# Patient Record
Sex: MALE | Race: WHITE | NOT HISPANIC OR LATINO | Employment: OTHER | URBAN - METROPOLITAN AREA
[De-identification: names, ages, dates, MRNs, and addresses within clinical notes are randomized per-mention and may not be internally consistent; named-entity substitution may affect disease eponyms.]

---

## 2017-01-08 DIAGNOSIS — I10 BENIGN ESSENTIAL HYPERTENSION: ICD-10-CM

## 2017-01-09 RX ORDER — IRBESARTAN 300 MG/1
TABLET ORAL
Qty: 90 TABLET | Refills: 0 | Status: SHIPPED | OUTPATIENT
Start: 2017-01-09 | End: 2017-02-20 | Stop reason: SDUPTHER

## 2017-01-13 ENCOUNTER — RESULTS ENCOUNTER (OUTPATIENT)
Dept: FAMILY MEDICINE CLINIC | Facility: CLINIC | Age: 82
End: 2017-01-13

## 2017-01-13 DIAGNOSIS — E53.8 B12 DEFICIENCY: ICD-10-CM

## 2017-01-13 DIAGNOSIS — R41.3 MEMORY LOSS: ICD-10-CM

## 2017-01-13 DIAGNOSIS — I10 BENIGN ESSENTIAL HYPERTENSION: ICD-10-CM

## 2017-01-17 DIAGNOSIS — K44.9 HIATAL HERNIA: ICD-10-CM

## 2017-01-17 DIAGNOSIS — K21.9 GASTROESOPHAGEAL REFLUX DISEASE, ESOPHAGITIS PRESENCE NOT SPECIFIED: ICD-10-CM

## 2017-01-17 RX ORDER — OMEPRAZOLE 40 MG/1
40 CAPSULE, DELAYED RELEASE ORAL NIGHTLY
Qty: 30 CAPSULE | Refills: 1 | Status: SHIPPED | OUTPATIENT
Start: 2017-01-17 | End: 2017-02-20 | Stop reason: SDUPTHER

## 2017-02-20 ENCOUNTER — OFFICE VISIT (OUTPATIENT)
Dept: FAMILY MEDICINE CLINIC | Facility: CLINIC | Age: 82
End: 2017-02-20

## 2017-02-20 VITALS
WEIGHT: 156 LBS | HEART RATE: 64 BPM | DIASTOLIC BLOOD PRESSURE: 68 MMHG | HEIGHT: 64 IN | RESPIRATION RATE: 16 BRPM | BODY MASS INDEX: 26.63 KG/M2 | SYSTOLIC BLOOD PRESSURE: 116 MMHG | TEMPERATURE: 97.8 F

## 2017-02-20 DIAGNOSIS — I10 BENIGN ESSENTIAL HYPERTENSION: Primary | ICD-10-CM

## 2017-02-20 DIAGNOSIS — K44.9 HIATAL HERNIA: ICD-10-CM

## 2017-02-20 DIAGNOSIS — E53.8 B12 DEFICIENCY: ICD-10-CM

## 2017-02-20 DIAGNOSIS — R41.3 MEMORY LOSS: ICD-10-CM

## 2017-02-20 DIAGNOSIS — K21.9 GASTROESOPHAGEAL REFLUX DISEASE, ESOPHAGITIS PRESENCE NOT SPECIFIED: ICD-10-CM

## 2017-02-20 DIAGNOSIS — Z13.6 SCREENING FOR ISCHEMIC HEART DISEASE: ICD-10-CM

## 2017-02-20 DIAGNOSIS — N40.1 BENIGN NON-NODULAR PROSTATIC HYPERPLASIA WITH LOWER URINARY TRACT SYMPTOMS: ICD-10-CM

## 2017-02-20 PROCEDURE — 99214 OFFICE O/P EST MOD 30 MIN: CPT | Performed by: FAMILY MEDICINE

## 2017-02-20 RX ORDER — OMEPRAZOLE 40 MG/1
40 CAPSULE, DELAYED RELEASE ORAL NIGHTLY
Qty: 90 CAPSULE | Refills: 1 | Status: SHIPPED | OUTPATIENT
Start: 2017-02-20 | End: 2017-08-21 | Stop reason: SDUPTHER

## 2017-02-20 RX ORDER — DOXAZOSIN MESYLATE 4 MG/1
4 TABLET ORAL DAILY
Qty: 90 TABLET | Refills: 1 | Status: SHIPPED | OUTPATIENT
Start: 2017-02-20 | End: 2017-08-21 | Stop reason: SDUPTHER

## 2017-02-20 RX ORDER — IRBESARTAN 300 MG/1
300 TABLET ORAL DAILY
Qty: 90 TABLET | Refills: 1 | Status: SHIPPED | OUTPATIENT
Start: 2017-02-20 | End: 2017-08-21 | Stop reason: SDUPTHER

## 2017-02-20 RX ORDER — METOPROLOL SUCCINATE 50 MG/1
50 TABLET, EXTENDED RELEASE ORAL NIGHTLY
Qty: 90 TABLET | Refills: 1 | Status: SHIPPED | OUTPATIENT
Start: 2017-02-20 | End: 2017-08-21 | Stop reason: SDUPTHER

## 2017-02-20 RX ORDER — RIVASTIGMINE 4.6 MG/24H
1 PATCH, EXTENDED RELEASE TRANSDERMAL DAILY
Qty: 90 PATCH | Refills: 1 | Status: SHIPPED | OUTPATIENT
Start: 2017-02-20 | End: 2017-08-21 | Stop reason: SDUPTHER

## 2017-02-20 NOTE — PROGRESS NOTES
"Chief Complaint   Patient presents with   • Hypertension       Subjective   This patient presents the office to refill medicines.  Labs are due.  Blood pressure is controlled.  GERD symptoms are stable with omeprazole.  He continues to use Exelon patch for his memory loss.  There is some skin irritation with the patch.The patient has been having some irritability on a daily basis for the past few months.     Review of Systems   Constitutional: Negative for fatigue.   Cardiovascular: Negative for chest pain.       Objective   Visit Vitals   • /68   • Pulse 64   • Temp 97.8 °F (36.6 °C) (Oral)   • Resp 16   • Ht 64\" (162.6 cm)   • Wt 156 lb (70.8 kg)   • BMI 26.78 kg/m2     Body mass index is 26.78 kg/(m^2).  Physical Exam   Constitutional: He is cooperative. No distress.   Eyes: Conjunctivae and lids are normal.   Neck: Carotid bruit is not present. No tracheal deviation present.   Cardiovascular: Normal rate, regular rhythm and normal heart sounds.    No murmur heard.  Pulmonary/Chest: Effort normal and breath sounds normal.   Neurological: He is alert. He is not disoriented.   Skin: Skin is warm and dry.   Psychiatric: He has a normal mood and affect. His speech is normal and behavior is normal.   Vitals reviewed.      Assessment/Plan     Problem List Items Addressed This Visit        Cardiovascular and Mediastinum    Benign essential hypertension - Primary    Relevant Medications    irbesartan (AVAPRO) 300 MG tablet    metoprolol succinate XL (TOPROL-XL) 50 MG 24 hr tablet    doxazosin (CARDURA) 4 MG tablet    Other Relevant Orders    Comprehensive metabolic panel    CBC and Differential    TSH       Respiratory    Hiatal hernia    Relevant Medications    omeprazole (PRILOSEC) 40 MG capsule       Digestive    GERD (gastroesophageal reflux disease)    Relevant Medications    omeprazole (PRILOSEC) 40 MG capsule    Other Relevant Orders    Comprehensive metabolic panel    CBC and Differential    B12 deficiency "    Relevant Orders    Vitamin B12 & Folate       Genitourinary    Benign hypertrophy of prostate       Other    Memory loss    Relevant Medications    rivastigmine (EXELON) 4.6 MG/24HR patch      Other Visit Diagnoses     Screening for ischemic heart disease        Relevant Orders    Lipid Panel With LDL/HDL Ratio          Outpatient Encounter Prescriptions as of 2/20/2017   Medication Sig Dispense Refill   • Cyanocobalamin (VITAMIN B-12) 2500 MCG sublingual tablet Place 1 tablet under the tongue daily.     • irbesartan (AVAPRO) 300 MG tablet Take 1 tablet by mouth Daily for 180 days. 90 tablet 1   • omeprazole (PRILOSEC) 40 MG capsule Take 1 capsule by mouth Every Night for 180 days. 90 capsule 1   • Polyethylene Glycol 3350 (MIRALAX PO) Take by mouth.     • [DISCONTINUED] irbesartan (AVAPRO) 300 MG tablet TAKE 1 TABLET EVERY NIGHT 90 tablet 0   • [DISCONTINUED] omeprazole (PRILOSEC) 40 MG capsule Take 1 capsule by mouth Every Night. 30 capsule 1   • doxazosin (CARDURA) 4 MG tablet Take 1 tablet by mouth Daily for 180 days. 90 tablet 1   • metoprolol succinate XL (TOPROL-XL) 50 MG 24 hr tablet Take 1 tablet by mouth Every Night for 180 days. 90 tablet 1   • rivastigmine (EXELON) 4.6 MG/24HR patch Place 1 patch on the skin Daily for 180 days. 90 patch 1   • [DISCONTINUED] doxazosin (CARDURA) 4 MG tablet Take 1 tablet by mouth Daily for 90 days. 90 tablet 0   • [DISCONTINUED] hyoscyamine (ANASPAZ,LEVSIN) 0.125 MG tablet Take 1 tablet by mouth 3 (three) times a day as needed for cramping for up to 180 days. 90 tablet 1   • [DISCONTINUED] metoprolol succinate XL (TOPROL-XL) 50 MG 24 hr tablet Take 1 tablet by mouth every night for 180 days. 90 tablet 1   • [DISCONTINUED] rivastigmine (EXELON) 4.6 MG/24HR patch Place 1 patch on the skin daily for 180 days. 90 patch 1     No facility-administered encounter medications on file as of 2/20/2017.        Orders Placed This Encounter   Procedures   • Comprehensive metabolic  panel     Standing Status:   Future     Standing Expiration Date:   11/17/2017   • Lipid Panel With LDL/HDL Ratio     Standing Status:   Future     Standing Expiration Date:   11/17/2017   • TSH     Standing Status:   Future     Standing Expiration Date:   11/17/2017   • Vitamin B12 & Folate     Standing Status:   Future     Standing Expiration Date:   11/17/2017       Continue with current treatment plan.         F/U in 6 months

## 2017-02-22 LAB
ALBUMIN SERPL-MCNC: 4.4 G/DL (ref 3.5–5.2)
ALBUMIN/GLOB SERPL: 1.8 G/DL
ALP SERPL-CCNC: 58 U/L (ref 39–117)
ALT SERPL-CCNC: 12 U/L (ref 1–41)
AST SERPL-CCNC: 21 U/L (ref 1–40)
BASOPHILS # BLD AUTO: 0.02 10*3/MM3 (ref 0–0.2)
BASOPHILS NFR BLD AUTO: 0.3 % (ref 0–1.5)
BILIRUB SERPL-MCNC: 0.9 MG/DL (ref 0.1–1.2)
BUN SERPL-MCNC: 12 MG/DL (ref 8–23)
BUN/CREAT SERPL: 15 (ref 7–25)
CALCIUM SERPL-MCNC: 9.6 MG/DL (ref 8.6–10.5)
CHLORIDE SERPL-SCNC: 100 MMOL/L (ref 98–107)
CHOLEST SERPL-MCNC: 157 MG/DL (ref 0–200)
CO2 SERPL-SCNC: 26.6 MMOL/L (ref 22–29)
CREAT SERPL-MCNC: 0.8 MG/DL (ref 0.76–1.27)
EOSINOPHIL # BLD AUTO: 0.12 10*3/MM3 (ref 0–0.7)
EOSINOPHIL NFR BLD AUTO: 2 % (ref 0.3–6.2)
ERYTHROCYTE [DISTWIDTH] IN BLOOD BY AUTOMATED COUNT: 13.1 % (ref 11.5–14.5)
FOLATE SERPL-MCNC: 14.84 NG/ML (ref 4.78–24.2)
GLOBULIN SER CALC-MCNC: 2.5 GM/DL
GLUCOSE SERPL-MCNC: 98 MG/DL (ref 65–99)
HCT VFR BLD AUTO: 42.4 % (ref 40.4–52.2)
HDLC SERPL-MCNC: 48 MG/DL (ref 40–60)
HGB BLD-MCNC: 13.7 G/DL (ref 13.7–17.6)
IMM GRANULOCYTES # BLD: 0 10*3/MM3 (ref 0–0.03)
IMM GRANULOCYTES NFR BLD: 0 % (ref 0–0.5)
LDLC SERPL CALC-MCNC: 86 MG/DL (ref 0–100)
LDLC/HDLC SERPL: 1.79 {RATIO}
LYMPHOCYTES # BLD AUTO: 1.52 10*3/MM3 (ref 0.9–4.8)
LYMPHOCYTES NFR BLD AUTO: 25 % (ref 19.6–45.3)
MCH RBC QN AUTO: 30 PG (ref 27–32.7)
MCHC RBC AUTO-ENTMCNC: 32.3 G/DL (ref 32.6–36.4)
MCV RBC AUTO: 92.8 FL (ref 79.8–96.2)
MONOCYTES # BLD AUTO: 0.46 10*3/MM3 (ref 0.2–1.2)
MONOCYTES NFR BLD AUTO: 7.6 % (ref 5–12)
NEUTROPHILS # BLD AUTO: 3.95 10*3/MM3 (ref 1.9–8.1)
NEUTROPHILS NFR BLD AUTO: 65.1 % (ref 42.7–76)
PLATELET # BLD AUTO: 195 10*3/MM3 (ref 140–500)
POTASSIUM SERPL-SCNC: 4.5 MMOL/L (ref 3.5–5.2)
PROT SERPL-MCNC: 6.9 G/DL (ref 6–8.5)
RBC # BLD AUTO: 4.57 10*6/MM3 (ref 4.6–6)
SODIUM SERPL-SCNC: 141 MMOL/L (ref 136–145)
TRIGL SERPL-MCNC: 116 MG/DL (ref 0–150)
TSH SERPL DL<=0.005 MIU/L-ACNC: 1.88 MIU/ML (ref 0.27–4.2)
VIT B12 SERPL-MCNC: 811 PG/ML (ref 211–946)
VLDLC SERPL CALC-MCNC: 23.2 MG/DL (ref 5–40)
WBC # BLD AUTO: 6.07 10*3/MM3 (ref 4.5–10.7)

## 2017-02-24 NOTE — PROGRESS NOTES
Please accept these satisfactory lab results. Please keep regular follow up appointment as scheduled.                                                    Dr. Hardwick

## 2017-07-15 DIAGNOSIS — I10 BENIGN ESSENTIAL HYPERTENSION: ICD-10-CM

## 2017-07-17 RX ORDER — DOXAZOSIN MESYLATE 4 MG/1
TABLET ORAL
Qty: 90 TABLET | Refills: 0 | OUTPATIENT
Start: 2017-07-17

## 2017-07-20 ENCOUNTER — RESULTS ENCOUNTER (OUTPATIENT)
Dept: FAMILY MEDICINE CLINIC | Facility: CLINIC | Age: 82
End: 2017-07-20

## 2017-07-20 DIAGNOSIS — K21.9 GASTROESOPHAGEAL REFLUX DISEASE, ESOPHAGITIS PRESENCE NOT SPECIFIED: ICD-10-CM

## 2017-07-20 DIAGNOSIS — Z13.6 SCREENING FOR ISCHEMIC HEART DISEASE: ICD-10-CM

## 2017-07-20 DIAGNOSIS — E53.8 B12 DEFICIENCY: ICD-10-CM

## 2017-07-20 DIAGNOSIS — I10 BENIGN ESSENTIAL HYPERTENSION: ICD-10-CM

## 2017-08-14 DIAGNOSIS — K44.9 HIATAL HERNIA: ICD-10-CM

## 2017-08-14 DIAGNOSIS — K21.9 GASTROESOPHAGEAL REFLUX DISEASE, ESOPHAGITIS PRESENCE NOT SPECIFIED: ICD-10-CM

## 2017-08-14 RX ORDER — OMEPRAZOLE 40 MG/1
CAPSULE, DELAYED RELEASE ORAL
Qty: 90 CAPSULE | Refills: 0 | OUTPATIENT
Start: 2017-08-14

## 2017-08-15 LAB
ALBUMIN SERPL-MCNC: 4.3 G/DL (ref 3.5–5.2)
ALBUMIN/GLOB SERPL: 2.2 G/DL
ALP SERPL-CCNC: 52 U/L (ref 39–117)
ALT SERPL-CCNC: 13 U/L (ref 1–41)
AST SERPL-CCNC: 18 U/L (ref 1–40)
BASOPHILS # BLD AUTO: 0.02 10*3/MM3 (ref 0–0.2)
BASOPHILS NFR BLD AUTO: 0.3 % (ref 0–1.5)
BILIRUB SERPL-MCNC: 0.5 MG/DL (ref 0.1–1.2)
BUN SERPL-MCNC: 14 MG/DL (ref 8–23)
BUN/CREAT SERPL: 18.7 (ref 7–25)
CALCIUM SERPL-MCNC: 9 MG/DL (ref 8.6–10.5)
CHLORIDE SERPL-SCNC: 103 MMOL/L (ref 98–107)
CHOLEST SERPL-MCNC: 145 MG/DL (ref 0–200)
CO2 SERPL-SCNC: 27 MMOL/L (ref 22–29)
CREAT SERPL-MCNC: 0.75 MG/DL (ref 0.76–1.27)
EOSINOPHIL # BLD AUTO: 0.18 10*3/MM3 (ref 0–0.7)
EOSINOPHIL NFR BLD AUTO: 3.1 % (ref 0.3–6.2)
ERYTHROCYTE [DISTWIDTH] IN BLOOD BY AUTOMATED COUNT: 12.9 % (ref 11.5–14.5)
FOLATE SERPL-MCNC: >20 NG/ML (ref 4.78–24.2)
GLOBULIN SER CALC-MCNC: 2 GM/DL
GLUCOSE SERPL-MCNC: 104 MG/DL (ref 65–99)
HCT VFR BLD AUTO: 40.3 % (ref 40.4–52.2)
HDLC SERPL-MCNC: 46 MG/DL (ref 40–60)
HGB BLD-MCNC: 13.1 G/DL (ref 13.7–17.6)
IMM GRANULOCYTES # BLD: 0 10*3/MM3 (ref 0–0.03)
IMM GRANULOCYTES NFR BLD: 0 % (ref 0–0.5)
LDLC SERPL CALC-MCNC: 82 MG/DL (ref 0–100)
LDLC/HDLC SERPL: 1.78 {RATIO}
LYMPHOCYTES # BLD AUTO: 1.23 10*3/MM3 (ref 0.9–4.8)
LYMPHOCYTES NFR BLD AUTO: 21.4 % (ref 19.6–45.3)
MCH RBC QN AUTO: 31.1 PG (ref 27–32.7)
MCHC RBC AUTO-ENTMCNC: 32.5 G/DL (ref 32.6–36.4)
MCV RBC AUTO: 95.7 FL (ref 79.8–96.2)
MONOCYTES # BLD AUTO: 0.42 10*3/MM3 (ref 0.2–1.2)
MONOCYTES NFR BLD AUTO: 7.3 % (ref 5–12)
NEUTROPHILS # BLD AUTO: 3.89 10*3/MM3 (ref 1.9–8.1)
NEUTROPHILS NFR BLD AUTO: 67.9 % (ref 42.7–76)
PLATELET # BLD AUTO: 161 10*3/MM3 (ref 140–500)
POTASSIUM SERPL-SCNC: 4.4 MMOL/L (ref 3.5–5.2)
PROT SERPL-MCNC: 6.3 G/DL (ref 6–8.5)
RBC # BLD AUTO: 4.21 10*6/MM3 (ref 4.6–6)
SODIUM SERPL-SCNC: 141 MMOL/L (ref 136–145)
TRIGL SERPL-MCNC: 86 MG/DL (ref 0–150)
TSH SERPL DL<=0.005 MIU/L-ACNC: 2.21 MIU/ML (ref 0.27–4.2)
VIT B12 SERPL-MCNC: 987 PG/ML (ref 211–946)
VLDLC SERPL CALC-MCNC: 17.2 MG/DL (ref 5–40)
WBC # BLD AUTO: 5.74 10*3/MM3 (ref 4.5–10.7)

## 2017-08-20 DIAGNOSIS — I10 BENIGN ESSENTIAL HYPERTENSION: ICD-10-CM

## 2017-08-20 DIAGNOSIS — R41.3 MEMORY LOSS: ICD-10-CM

## 2017-08-21 ENCOUNTER — OFFICE VISIT (OUTPATIENT)
Dept: FAMILY MEDICINE CLINIC | Facility: CLINIC | Age: 82
End: 2017-08-21

## 2017-08-21 VITALS
BODY MASS INDEX: 26.46 KG/M2 | HEART RATE: 68 BPM | SYSTOLIC BLOOD PRESSURE: 142 MMHG | WEIGHT: 155 LBS | RESPIRATION RATE: 16 BRPM | TEMPERATURE: 97.3 F | DIASTOLIC BLOOD PRESSURE: 82 MMHG | HEIGHT: 64 IN

## 2017-08-21 DIAGNOSIS — Z23 IMMUNIZATION DUE: ICD-10-CM

## 2017-08-21 DIAGNOSIS — K44.9 HIATAL HERNIA: ICD-10-CM

## 2017-08-21 DIAGNOSIS — K21.9 GASTROESOPHAGEAL REFLUX DISEASE, ESOPHAGITIS PRESENCE NOT SPECIFIED: ICD-10-CM

## 2017-08-21 DIAGNOSIS — E53.8 B12 DEFICIENCY: ICD-10-CM

## 2017-08-21 DIAGNOSIS — R41.3 MEMORY LOSS: ICD-10-CM

## 2017-08-21 DIAGNOSIS — I10 BENIGN ESSENTIAL HYPERTENSION: Primary | ICD-10-CM

## 2017-08-21 PROCEDURE — 90471 IMMUNIZATION ADMIN: CPT | Performed by: FAMILY MEDICINE

## 2017-08-21 PROCEDURE — 90732 PPSV23 VACC 2 YRS+ SUBQ/IM: CPT | Performed by: FAMILY MEDICINE

## 2017-08-21 PROCEDURE — 99214 OFFICE O/P EST MOD 30 MIN: CPT | Performed by: FAMILY MEDICINE

## 2017-08-21 RX ORDER — METOPROLOL SUCCINATE 50 MG/1
TABLET, EXTENDED RELEASE ORAL
Qty: 90 TABLET | Refills: 0 | OUTPATIENT
Start: 2017-08-21

## 2017-08-21 RX ORDER — IRBESARTAN 300 MG/1
300 TABLET ORAL DAILY
Qty: 90 TABLET | Refills: 1 | Status: SHIPPED | OUTPATIENT
Start: 2017-08-21 | End: 2018-01-29 | Stop reason: SDUPTHER

## 2017-08-21 RX ORDER — RIVASTIGMINE 4.6 MG/24H
1 PATCH, EXTENDED RELEASE TRANSDERMAL DAILY
Qty: 90 PATCH | Refills: 0 | Status: SHIPPED | OUTPATIENT
Start: 2017-08-21 | End: 2017-12-11 | Stop reason: SDUPTHER

## 2017-08-21 RX ORDER — DOXAZOSIN MESYLATE 4 MG/1
4 TABLET ORAL DAILY
Qty: 90 TABLET | Refills: 1 | Status: SHIPPED | OUTPATIENT
Start: 2017-08-21 | End: 2018-01-29 | Stop reason: SDUPTHER

## 2017-08-21 RX ORDER — METOPROLOL SUCCINATE 50 MG/1
50 TABLET, EXTENDED RELEASE ORAL NIGHTLY
Qty: 90 TABLET | Refills: 1 | Status: SHIPPED | OUTPATIENT
Start: 2017-08-21 | End: 2018-01-29 | Stop reason: SDUPTHER

## 2017-08-21 RX ORDER — RIVASTIGMINE 4.6 MG/24H
PATCH, EXTENDED RELEASE TRANSDERMAL
Refills: 0 | OUTPATIENT
Start: 2017-08-21

## 2017-08-21 RX ORDER — OMEPRAZOLE 40 MG/1
40 CAPSULE, DELAYED RELEASE ORAL NIGHTLY
Qty: 90 CAPSULE | Refills: 1 | Status: SHIPPED | OUTPATIENT
Start: 2017-08-21 | End: 2018-01-29 | Stop reason: SDUPTHER

## 2017-08-21 NOTE — PROGRESS NOTES
"Chief Complaint   Patient presents with   • Hypertension   • Heartburn   • Memory Loss       Subjective   Pt is here for lab review and medication refill. Labs show mild elevation of blood sugar.  His labs also showed some mild anemia.  We will continue to watch.  Vitamin B12 level was supratherapeutic.  He will decrease his current dose to 3 times weekly.  Lipids are stable.  Thyroid is normal.  He has had some difficulty with swallowing.  He has to use hot coffee to get some food down.  He continues to take proton pump inhibitor.  No interval weight loss noted.  He also has problem of increased gas and will start over-the-counter product with simethicone.  He continues to use Exelon patch for helping with memory loss.  Patient states that recent PSA was nondetectable.  I have reviewed and updated his medications, medical history and problem list during today's office visit.        Social History   Substance Use Topics   • Smoking status: Never Smoker   • Smokeless tobacco: Never Used   • Alcohol use No       Review of Systems   Constitutional: Negative for fatigue.   Cardiovascular: Negative for chest pain.       Objective   /82 (BP Location: Right arm, Patient Position: Sitting, Cuff Size: Adult)  Pulse 68  Temp 97.3 °F (36.3 °C) (Oral)   Resp 16  Ht 64\" (162.6 cm)  Wt 155 lb (70.3 kg)  BMI 26.61 kg/m2  Body mass index is 26.61 kg/(m^2).  Physical Exam   Constitutional: He is cooperative. No distress.   Eyes: Conjunctivae and lids are normal.   Neck: Carotid bruit is not present. No tracheal deviation present.   Cardiovascular: Normal rate, regular rhythm and normal heart sounds.    No murmur heard.  Pulmonary/Chest: Effort normal and breath sounds normal.   Neurological: He is alert. He is not disoriented.   Skin: Skin is warm and dry.   Psychiatric: He has a normal mood and affect. His speech is normal and behavior is normal.   Vitals reviewed.      Data Reviewed:    CMP:  Lab Results   Component " Value Date     (H) 08/15/2017    BUN 14 08/15/2017    CREATININE 0.75 (L) 08/15/2017    EGFRIFNONA 99 08/15/2017    EGFRIFAFRI 120 08/15/2017     08/15/2017    K 4.4 08/15/2017     08/15/2017    CALCIUM 9.0 08/15/2017    PROTENTOTREF 6.3 08/15/2017    ALBUMIN 4.30 08/15/2017    LABGLOBREF 2.0 08/15/2017    BILITOT 0.5 08/15/2017    ALKPHOS 52 08/15/2017    AST 18 08/15/2017    ALT 13 08/15/2017     CBC w/ diff:   Lab Results   Component Value Date    WBC 5.74 08/15/2017    RBC 4.21 (L) 08/15/2017    HGB 13.1 (L) 08/15/2017    HCT 40.3 (L) 08/15/2017    MCV 95.7 08/15/2017    MCH 31.1 08/15/2017    MCHC 32.5 (L) 08/15/2017    RDW 12.9 08/15/2017     08/15/2017    NEUTRORELPCT 67.9 08/15/2017    LYMPHORELPCT 21.4 08/15/2017    MONORELPCT 7.3 08/15/2017    EOSRELPCT 3.1 08/15/2017    BASORELPCT 0.3 08/15/2017     LIPID PANEL:  Lab Results   Component Value Date    CHLPL 145 08/15/2017    TRIG 86 08/15/2017    HDL 46 08/15/2017    VLDL 17.2 08/15/2017    LDL 82 08/15/2017    LDLHDL 1.78 08/15/2017     TSH:  Lab Results   Component Value Date    TSH 2.210 08/15/2017       Assessment/Plan     Problem List Items Addressed This Visit        Cardiovascular and Mediastinum    Benign essential hypertension - Primary    Relevant Medications    irbesartan (AVAPRO) 300 MG tablet    doxazosin (CARDURA) 4 MG tablet    metoprolol succinate XL (TOPROL-XL) 50 MG 24 hr tablet    Other Relevant Orders    Comprehensive metabolic panel    CBC and Differential    TSH       Respiratory    Hiatal hernia    Relevant Medications    omeprazole (PRILOSEC) 40 MG capsule       Digestive    GERD (gastroesophageal reflux disease)    Relevant Medications    omeprazole (PRILOSEC) 40 MG capsule    B12 deficiency    Relevant Medications    cyanocobalamin (RA VITAMIN B-12) 100 MCG tablet       Other    Memory loss    Relevant Medications    rivastigmine (EXELON) 4.6 MG/24HR patch      Other Visit Diagnoses     Immunization due         Relevant Orders    Pneumococcal Polysaccharide Vaccine 23-Valent Greater Than or Equal To 3yo Subcutaneous / IM          Outpatient Encounter Prescriptions as of 8/21/2017   Medication Sig Dispense Refill   • Polyethylene Glycol 3350 (MIRALAX PO) Take by mouth.     • [DISCONTINUED] Cyanocobalamin (VITAMIN B-12) 2500 MCG sublingual tablet Place 1 tablet under the tongue daily.     • cyanocobalamin (RA VITAMIN B-12) 100 MCG tablet Take 1 tablet by mouth Daily for 180 days. 30 tablet 5   • doxazosin (CARDURA) 4 MG tablet Take 1 tablet by mouth Daily for 180 days. 90 tablet 1   • irbesartan (AVAPRO) 300 MG tablet Take 1 tablet by mouth Daily for 180 days. 90 tablet 1   • metoprolol succinate XL (TOPROL-XL) 50 MG 24 hr tablet Take 1 tablet by mouth Every Night for 180 days. 90 tablet 1   • omeprazole (PRILOSEC) 40 MG capsule Take 1 capsule by mouth Every Night for 180 days. 90 capsule 1   • rivastigmine (EXELON) 4.6 MG/24HR patch Place 1 patch on the skin Daily for 90 days. 90 patch 0   • [DISCONTINUED] doxazosin (CARDURA) 4 MG tablet Take 1 tablet by mouth Daily for 180 days. 90 tablet 1   • [DISCONTINUED] irbesartan (AVAPRO) 300 MG tablet Take 1 tablet by mouth Daily for 180 days. 90 tablet 1   • [DISCONTINUED] metoprolol succinate XL (TOPROL-XL) 50 MG 24 hr tablet Take 1 tablet by mouth Every Night for 180 days. 90 tablet 1   • [DISCONTINUED] omeprazole (PRILOSEC) 40 MG capsule Take 1 capsule by mouth Every Night for 180 days. 90 capsule 1   • [DISCONTINUED] rivastigmine (EXELON) 4.6 MG/24HR patch Place 1 patch on the skin Daily for 180 days. 90 patch 1     No facility-administered encounter medications on file as of 8/21/2017.        Orders Placed This Encounter   Procedures   • Pneumococcal Polysaccharide Vaccine 23-Valent Greater Than or Equal To 3yo Subcutaneous / IM   • Comprehensive metabolic panel     Standing Status:   Future     Standing Expiration Date:   5/18/2018   • TSH     Standing Status:   Future      Standing Expiration Date:   5/18/2018   • CBC and Differential     Standing Status:   Future     Standing Expiration Date:   5/18/2018     Order Specific Question:   Manual Differential     Answer:   No       Continue with current treatment plan.         F/U in 6 months

## 2017-08-21 NOTE — PATIENT INSTRUCTIONS
Check on insurance coverage and cost for adacel Tdap(tetanus plus whooping cough vaccine) and get the immunization at your local pharmacy    Influenza vaccine in October

## 2017-10-11 ENCOUNTER — OFFICE VISIT (OUTPATIENT)
Dept: FAMILY MEDICINE CLINIC | Facility: CLINIC | Age: 82
End: 2017-10-11

## 2017-10-11 VITALS
TEMPERATURE: 97.7 F | WEIGHT: 150 LBS | HEART RATE: 86 BPM | DIASTOLIC BLOOD PRESSURE: 88 MMHG | RESPIRATION RATE: 16 BRPM | BODY MASS INDEX: 25.61 KG/M2 | HEIGHT: 64 IN | SYSTOLIC BLOOD PRESSURE: 161 MMHG

## 2017-10-11 DIAGNOSIS — R61 UNEXPLAINED NIGHT SWEATS: Primary | ICD-10-CM

## 2017-10-11 DIAGNOSIS — R13.19 OTHER DYSPHAGIA: ICD-10-CM

## 2017-10-11 DIAGNOSIS — R63.4 WEIGHT LOSS: ICD-10-CM

## 2017-10-11 DIAGNOSIS — R23.2 HOT FLASHES: ICD-10-CM

## 2017-10-11 PROCEDURE — 99214 OFFICE O/P EST MOD 30 MIN: CPT | Performed by: FAMILY MEDICINE

## 2017-10-11 PROCEDURE — 71020 XR CHEST PA AND LATERAL: CPT | Performed by: FAMILY MEDICINE

## 2017-10-11 NOTE — PROGRESS NOTES
"Chief Complaint   Patient presents with   • Night Sweats       Subjective   This patient presents the office with a chief complaint of severe night sweats.  His symptoms started about 4 weeks ago.  He is awakened at night 1-2 times each evening with drenching of his shirt and bedclothes.  There has been mild interval weight loss.  The patient also has issues of difficulty swallowing.  He does take omeprazole.  His last upper endoscopy was about 3 years ago.  I have reviewed and updated his medications, medical history and problem list during today's office visit.        Social History   Substance Use Topics   • Smoking status: Never Smoker   • Smokeless tobacco: Never Used   • Alcohol use No       Review of Systems   Constitutional: Positive for diaphoresis and unexpected weight change. Negative for fever.   HENT: Negative for sore throat.    Gastrointestinal:        Trouble swallowing       Objective   /88  Pulse 86  Temp 97.7 °F (36.5 °C) (Oral)   Resp 16  Ht 64\" (162.6 cm)  Wt 150 lb (68 kg)  BMI 25.75 kg/m2  Body mass index is 25.75 kg/(m^2).  Physical Exam   Constitutional: He is cooperative. No distress.   Eyes: Conjunctivae and lids are normal.   Neck: Carotid bruit is not present. No tracheal deviation present.   Cardiovascular: Normal rate, regular rhythm and normal heart sounds.    No murmur heard.  Pulmonary/Chest: Effort normal and breath sounds normal.   Neurological: He is alert. He is not disoriented.   Skin: Skin is warm and dry.   Psychiatric: He has a normal mood and affect. His speech is normal and behavior is normal.   Vitals reviewed.      Data Reviewed:    Views: lateral and posterior-anterior    Relevant Clinical Issues/Diagnoses/Indications for XRay: see HPI  Clinical Findings: Chest: was negative for infiltrate, effusion, pneumothorax, or wide mediastinum    Compared with previous XRay? No comparison or previous xray was looked at today    Date of Previous Xray:unknown " date    Changes on current Xray? No comparison or previous xray was looked at today      Assessment/Plan     Problem List Items Addressed This Visit        Digestive    Other dysphagia    Relevant Orders    Ambulatory Referral to Gastroenterology      Other Visit Diagnoses     Unexplained night sweats    -  Primary    Relevant Orders    XR Chest PA & Lateral    Comprehensive metabolic panel    CBC and Differential    TSH    Ambulatory Referral to Gastroenterology    Hot flashes        Relevant Orders    XR Chest PA & Lateral    Comprehensive metabolic panel    CBC and Differential    TSH    Ambulatory Referral to Gastroenterology    Weight loss        Relevant Orders    XR Chest PA & Lateral    Comprehensive metabolic panel    CBC and Differential    TSH    Ambulatory Referral to Gastroenterology          Outpatient Encounter Prescriptions as of 10/11/2017   Medication Sig Dispense Refill   • cyanocobalamin (RA VITAMIN B-12) 100 MCG tablet Take 1 tablet by mouth Daily for 180 days. 30 tablet 5   • doxazosin (CARDURA) 4 MG tablet Take 1 tablet by mouth Daily for 180 days. 90 tablet 1   • irbesartan (AVAPRO) 300 MG tablet Take 1 tablet by mouth Daily for 180 days. 90 tablet 1   • metoprolol succinate XL (TOPROL-XL) 50 MG 24 hr tablet Take 1 tablet by mouth Every Night for 180 days. 90 tablet 1   • omeprazole (PRILOSEC) 40 MG capsule Take 1 capsule by mouth Every Night for 180 days. 90 capsule 1   • Polyethylene Glycol 3350 (MIRALAX PO) Take by mouth.     • rivastigmine (EXELON) 4.6 MG/24HR patch Place 1 patch on the skin Daily for 90 days. 90 patch 0     No facility-administered encounter medications on file as of 10/11/2017.        Orders Placed This Encounter   Procedures   • XR Chest PA & Lateral     Order Specific Question:   Reason for Exam:     Answer:   night sweats   • Comprehensive metabolic panel     Standing Status:   Future     Standing Expiration Date:   7/8/2018   • TSH     Standing Status:   Future      Standing Expiration Date:   7/8/2018   • Ambulatory Referral to Gastroenterology     Referral Priority:   Routine     Referral Type:   Consultation     Referral Reason:   Specialty Services Required     Referred to Provider:   Adalid Mazariegos MD     Requested Specialty:   Gastroenterology     Number of Visits Requested:   1   • CBC and Differential     Standing Status:   Future     Standing Expiration Date:   7/8/2018     Order Specific Question:   Manual Differential     Answer:   No       Continue with current treatment plan.         RTC as needed or sooner if symptoms worsen or change

## 2017-10-12 DIAGNOSIS — R63.4 WEIGHT LOSS: ICD-10-CM

## 2017-10-12 DIAGNOSIS — R23.2 HOT FLASHES: ICD-10-CM

## 2017-10-12 DIAGNOSIS — I10 BENIGN ESSENTIAL HYPERTENSION: ICD-10-CM

## 2017-10-12 DIAGNOSIS — R61 UNEXPLAINED NIGHT SWEATS: ICD-10-CM

## 2017-10-12 LAB
ALBUMIN SERPL-MCNC: 4.2 G/DL (ref 3.5–5.2)
ALBUMIN/GLOB SERPL: 1.9 G/DL
ALP SERPL-CCNC: 52 U/L (ref 39–117)
ALT SERPL-CCNC: 11 U/L (ref 1–41)
AST SERPL-CCNC: 18 U/L (ref 1–40)
BASOPHILS # BLD AUTO: 0.01 10*3/MM3 (ref 0–0.2)
BASOPHILS NFR BLD AUTO: 0.2 % (ref 0–1.5)
BILIRUB SERPL-MCNC: 0.7 MG/DL (ref 0.1–1.2)
BUN SERPL-MCNC: 11 MG/DL (ref 8–23)
BUN/CREAT SERPL: 12.1 (ref 7–25)
CALCIUM SERPL-MCNC: 9.1 MG/DL (ref 8.6–10.5)
CHLORIDE SERPL-SCNC: 101 MMOL/L (ref 98–107)
CO2 SERPL-SCNC: 26.1 MMOL/L (ref 22–29)
CREAT SERPL-MCNC: 0.91 MG/DL (ref 0.76–1.27)
EOSINOPHIL # BLD AUTO: 0.18 10*3/MM3 (ref 0–0.7)
EOSINOPHIL NFR BLD AUTO: 3.2 % (ref 0.3–6.2)
ERYTHROCYTE [DISTWIDTH] IN BLOOD BY AUTOMATED COUNT: 12.9 % (ref 11.5–14.5)
GLOBULIN SER CALC-MCNC: 2.2 GM/DL
GLUCOSE SERPL-MCNC: 99 MG/DL (ref 65–99)
HCT VFR BLD AUTO: 39.7 % (ref 40.4–52.2)
HGB BLD-MCNC: 13.1 G/DL (ref 13.7–17.6)
IMM GRANULOCYTES # BLD: 0 10*3/MM3 (ref 0–0.03)
IMM GRANULOCYTES NFR BLD: 0 % (ref 0–0.5)
LYMPHOCYTES # BLD AUTO: 1.56 10*3/MM3 (ref 0.9–4.8)
LYMPHOCYTES NFR BLD AUTO: 28 % (ref 19.6–45.3)
MCH RBC QN AUTO: 31 PG (ref 27–32.7)
MCHC RBC AUTO-ENTMCNC: 33 G/DL (ref 32.6–36.4)
MCV RBC AUTO: 94.1 FL (ref 79.8–96.2)
MONOCYTES # BLD AUTO: 0.34 10*3/MM3 (ref 0.2–1.2)
MONOCYTES NFR BLD AUTO: 6.1 % (ref 5–12)
NEUTROPHILS # BLD AUTO: 3.49 10*3/MM3 (ref 1.9–8.1)
NEUTROPHILS NFR BLD AUTO: 62.5 % (ref 42.7–76)
PLATELET # BLD AUTO: 164 10*3/MM3 (ref 140–500)
POTASSIUM SERPL-SCNC: 4.3 MMOL/L (ref 3.5–5.2)
PROT SERPL-MCNC: 6.4 G/DL (ref 6–8.5)
RBC # BLD AUTO: 4.22 10*6/MM3 (ref 4.6–6)
SODIUM SERPL-SCNC: 139 MMOL/L (ref 136–145)
TSH SERPL DL<=0.005 MIU/L-ACNC: 1.54 MIU/ML (ref 0.27–4.2)
WBC # BLD AUTO: 5.58 10*3/MM3 (ref 4.5–10.7)

## 2017-10-16 NOTE — PROGRESS NOTES
Call patient and let him know that labs do not suggest a reason for his night sweats. Hemoglobin is mildly decreased but stable when compared with labs 7 months ago. If GI workup is negative, then have him follow up with me again to look at this hemoglobin problem. Thanks, Dr. Hardwick

## 2017-10-26 ENCOUNTER — OFFICE (AMBULATORY)
Dept: URBAN - METROPOLITAN AREA CLINIC 75 | Facility: CLINIC | Age: 82
End: 2017-10-26

## 2017-10-26 VITALS
SYSTOLIC BLOOD PRESSURE: 142 MMHG | WEIGHT: 150 LBS | HEIGHT: 62 IN | HEART RATE: 86 BPM | DIASTOLIC BLOOD PRESSURE: 82 MMHG

## 2017-10-26 DIAGNOSIS — R63.4 ABNORMAL WEIGHT LOSS: ICD-10-CM

## 2017-10-26 DIAGNOSIS — R13.10 DYSPHAGIA, UNSPECIFIED: ICD-10-CM

## 2017-10-26 DIAGNOSIS — R93.3 ABNORMAL FINDINGS ON DIAGNOSTIC IMAGING OF OTHER PARTS OF DI: ICD-10-CM

## 2017-10-26 PROCEDURE — 99203 OFFICE O/P NEW LOW 30 MIN: CPT | Performed by: INTERNAL MEDICINE

## 2017-10-26 NOTE — SERVICEHPINOTES
I had the pleasure of seeing Mr. Yanez in our gastroenterology office for new patient consultation. As you know, he is a pleasant 86 year old  gentleman that presents with his wife and daughter for evaluation of recurrent difficulty swallowing solids and weight loss. Patient reports a long history of dysphagia with multiple EGDs and dilation with reported improvement of symptoms. His most recent EGD was done 4/1/2013 at [facility] in [place], [place]. He had an esophagram 8/25/2015 that showed a para-esophageal hernia. At present, he reports difficulty swallow with food impaction that occurs more so with meats and breads. This occurs 3-4 times per week and more often with pills. He will have to regurgitate food boluses he is unable to pass. He denies trouble with thin liquids. He takes omeprazole 40mg once daily and reports good control of his reflux. He denies odynophagia, nausea, vomiting, abdominal pain, bloating or distension. Family history si negative for esophageal or gastric cancer. He is a non-smoker.  Patient states that he has a 1 formed BM per day as long as he takes a daily does of Miralax. He denies change in bowel pattern, blood in his stool, melena or unexplained weight loss. He has had colonoscopies in the past without significant findings. Family history is negative for polyps, colitis or colon cancer. We do not have these for review at todays office visit. He denies family history of colon cancer or IBD. He reports weight loss of 6 pounds over the last 8 months however patient admits to watching his weight. Appetite is in tact and he eats a balance diet of fruits and vegetables, "wife is a good cook". BR

## 2017-11-06 VITALS
HEART RATE: 62 BPM | SYSTOLIC BLOOD PRESSURE: 124 MMHG | OXYGEN SATURATION: 99 % | HEART RATE: 53 BPM | OXYGEN SATURATION: 90 % | HEART RATE: 46 BPM | OXYGEN SATURATION: 92 % | DIASTOLIC BLOOD PRESSURE: 78 MMHG | RESPIRATION RATE: 34 BRPM | TEMPERATURE: 97.9 F | SYSTOLIC BLOOD PRESSURE: 188 MMHG | DIASTOLIC BLOOD PRESSURE: 70 MMHG | RESPIRATION RATE: 19 BRPM | DIASTOLIC BLOOD PRESSURE: 79 MMHG | SYSTOLIC BLOOD PRESSURE: 181 MMHG | DIASTOLIC BLOOD PRESSURE: 82 MMHG | SYSTOLIC BLOOD PRESSURE: 150 MMHG | HEART RATE: 75 BPM | HEART RATE: 64 BPM | SYSTOLIC BLOOD PRESSURE: 180 MMHG | TEMPERATURE: 97.7 F | SYSTOLIC BLOOD PRESSURE: 138 MMHG | WEIGHT: 147 LBS | SYSTOLIC BLOOD PRESSURE: 185 MMHG | DIASTOLIC BLOOD PRESSURE: 69 MMHG | HEART RATE: 63 BPM | SYSTOLIC BLOOD PRESSURE: 208 MMHG | OXYGEN SATURATION: 95 % | RESPIRATION RATE: 16 BRPM | HEIGHT: 62 IN | HEART RATE: 60 BPM | HEART RATE: 58 BPM | DIASTOLIC BLOOD PRESSURE: 65 MMHG | DIASTOLIC BLOOD PRESSURE: 84 MMHG

## 2017-11-07 ENCOUNTER — AMBULATORY SURGICAL CENTER (AMBULATORY)
Dept: URBAN - METROPOLITAN AREA SURGERY 17 | Facility: SURGERY | Age: 82
End: 2017-11-07
Payer: MEDICARE

## 2017-11-07 DIAGNOSIS — R13.10 DYSPHAGIA, UNSPECIFIED: ICD-10-CM

## 2017-11-07 DIAGNOSIS — K44.9 DIAPHRAGMATIC HERNIA WITHOUT OBSTRUCTION OR GANGRENE: ICD-10-CM

## 2017-11-07 DIAGNOSIS — R63.4 ABNORMAL WEIGHT LOSS: ICD-10-CM

## 2017-11-07 DIAGNOSIS — K57.10 DIVERTICULOSIS OF SMALL INTESTINE WITHOUT PERFORATION OR ABS: ICD-10-CM

## 2017-11-07 DIAGNOSIS — R93.3 ABNORMAL FINDINGS ON DIAGNOSTIC IMAGING OF OTHER PARTS OF DI: ICD-10-CM

## 2017-11-07 PROCEDURE — 43235 EGD DIAGNOSTIC BRUSH WASH: CPT | Performed by: INTERNAL MEDICINE

## 2017-11-07 RX ADMIN — PROPOFOL 25 MG: 10 INJECTION, EMULSION INTRAVENOUS at 14:40

## 2017-11-07 RX ADMIN — PROPOFOL 25 MG: 10 INJECTION, EMULSION INTRAVENOUS at 14:41

## 2017-11-07 RX ADMIN — PROPOFOL 25 MG: 10 INJECTION, EMULSION INTRAVENOUS at 14:42

## 2017-11-07 RX ADMIN — PROPOFOL 75 MG: 10 INJECTION, EMULSION INTRAVENOUS at 14:38

## 2017-11-07 RX ADMIN — LIDOCAINE HYDROCHLORIDE 30 MG: 10 INJECTION, SOLUTION EPIDURAL; INFILTRATION; INTRACAUDAL; PERINEURAL at 14:37

## 2017-11-07 NOTE — SERVICEHPINOTES
I had the pleasure of seeing Mr. Yanez in our gastroenterology office for new patient consultation. As you know, he is a pleasant 86 year old  gentleman that presents with his wife and daughter for evaluation of recurrent difficulty swallowing solids and weight loss. Patient reports a long history of dysphagia with multiple EGDs and dilation with reported improvement of symptoms. His most recent EGD was done 4/1/2013 at [facility] in [place], [place]. He had an esophagram 8/25/2015 that showed a para-esophageal hernia. At present, he reports difficulty swallow with food impaction that occurs more so with meats and breads. This occurs 3-4 times per week and more often with pills. He will have to regurgitate food boluses he is unable to pass. He denies trouble with thin liquids. He takes omeprazole 40mg once daily and reports good control of his reflux. He denies odynophagia, nausea, vomiting, abdominal pain, bloating or distension. Family history si negative for esophageal or gastric cancer. He is a non-smoker. Patient states that he has a 1 formed BM per day as long as he takes a daily does of Miralax. He denies change in bowel pattern, blood in his stool, melena or unexplained weight loss. He has had colonoscopies in the past without significant findings. Family history is negative for polyps, colitis or colon cancer. We do not have these for review at todays office visit. He denies family history of colon cancer or IBD. He reports weight loss of 6 pounds over the last 8 months however patient admits to watching his weight. Appetite is in tact and he eats a balance diet of fruits and vegetables, "wife is a good cook".

## 2017-12-11 DIAGNOSIS — R41.3 MEMORY LOSS: ICD-10-CM

## 2017-12-12 RX ORDER — RIVASTIGMINE 4.6 MG/24H
PATCH, EXTENDED RELEASE TRANSDERMAL
Qty: 90 PATCH | Refills: 0 | Status: SHIPPED | OUTPATIENT
Start: 2017-12-12 | End: 2018-01-29 | Stop reason: SDUPTHER

## 2018-01-01 ENCOUNTER — OFFICE (AMBULATORY)
Dept: URBAN - METROPOLITAN AREA CLINIC 75 | Facility: CLINIC | Age: 83
End: 2018-01-01

## 2018-01-01 VITALS
WEIGHT: 160 LBS | HEIGHT: 62 IN | HEART RATE: 70 BPM | DIASTOLIC BLOOD PRESSURE: 72 MMHG | SYSTOLIC BLOOD PRESSURE: 126 MMHG

## 2018-01-01 DIAGNOSIS — K57.10 DIVERTICULOSIS OF SMALL INTESTINE WITHOUT PERFORATION OR ABS: ICD-10-CM

## 2018-01-01 DIAGNOSIS — R13.10 DYSPHAGIA, UNSPECIFIED: ICD-10-CM

## 2018-01-01 DIAGNOSIS — R93.3 ABNORMAL FINDINGS ON DIAGNOSTIC IMAGING OF OTHER PARTS OF DI: ICD-10-CM

## 2018-01-01 DIAGNOSIS — K29.70 GASTRITIS, UNSPECIFIED, WITHOUT BLEEDING: ICD-10-CM

## 2018-01-01 DIAGNOSIS — K22.2 ESOPHAGEAL OBSTRUCTION: ICD-10-CM

## 2018-01-01 DIAGNOSIS — R63.4 ABNORMAL WEIGHT LOSS: ICD-10-CM

## 2018-01-01 DIAGNOSIS — K44.9 DIAPHRAGMATIC HERNIA WITHOUT OBSTRUCTION OR GANGRENE: ICD-10-CM

## 2018-01-01 PROCEDURE — 99214 OFFICE O/P EST MOD 30 MIN: CPT | Performed by: INTERNAL MEDICINE

## 2018-01-29 ENCOUNTER — OFFICE VISIT (OUTPATIENT)
Dept: FAMILY MEDICINE CLINIC | Facility: CLINIC | Age: 83
End: 2018-01-29

## 2018-01-29 VITALS
WEIGHT: 147 LBS | DIASTOLIC BLOOD PRESSURE: 68 MMHG | TEMPERATURE: 97.2 F | HEART RATE: 65 BPM | HEIGHT: 64 IN | SYSTOLIC BLOOD PRESSURE: 148 MMHG | RESPIRATION RATE: 16 BRPM | BODY MASS INDEX: 25.1 KG/M2

## 2018-01-29 DIAGNOSIS — I10 BENIGN ESSENTIAL HYPERTENSION: Primary | ICD-10-CM

## 2018-01-29 DIAGNOSIS — R45.1 AGITATION: ICD-10-CM

## 2018-01-29 DIAGNOSIS — K44.9 HIATAL HERNIA: ICD-10-CM

## 2018-01-29 DIAGNOSIS — R41.3 MEMORY LOSS: ICD-10-CM

## 2018-01-29 DIAGNOSIS — K21.9 GASTROESOPHAGEAL REFLUX DISEASE, ESOPHAGITIS PRESENCE NOT SPECIFIED: ICD-10-CM

## 2018-01-29 PROBLEM — R13.19 OTHER DYSPHAGIA: Status: RESOLVED | Noted: 2017-10-11 | Resolved: 2018-01-29

## 2018-01-29 PROCEDURE — 99214 OFFICE O/P EST MOD 30 MIN: CPT | Performed by: FAMILY MEDICINE

## 2018-01-29 RX ORDER — RIVASTIGMINE 4.6 MG/24H
1 PATCH, EXTENDED RELEASE TRANSDERMAL DAILY
Qty: 90 PATCH | Refills: 1 | Status: SHIPPED | OUTPATIENT
Start: 2018-01-29 | End: 2018-05-10 | Stop reason: SDUPTHER

## 2018-01-29 RX ORDER — BUSPIRONE HYDROCHLORIDE 5 MG/1
5 TABLET ORAL
Qty: 180 TABLET | Refills: 1 | Status: SHIPPED | OUTPATIENT
Start: 2018-01-29 | End: 2018-03-12 | Stop reason: SDUPTHER

## 2018-01-29 RX ORDER — DOXAZOSIN MESYLATE 4 MG/1
4 TABLET ORAL DAILY
Qty: 90 TABLET | Refills: 1 | Status: SHIPPED | OUTPATIENT
Start: 2018-01-29 | End: 2018-05-10 | Stop reason: SDUPTHER

## 2018-01-29 RX ORDER — OMEPRAZOLE 40 MG/1
40 CAPSULE, DELAYED RELEASE ORAL NIGHTLY
Qty: 90 CAPSULE | Refills: 1 | Status: SHIPPED | OUTPATIENT
Start: 2018-01-29 | End: 2018-05-10 | Stop reason: SDUPTHER

## 2018-01-29 RX ORDER — METOPROLOL SUCCINATE 50 MG/1
50 TABLET, EXTENDED RELEASE ORAL NIGHTLY
Qty: 90 TABLET | Refills: 1 | Status: SHIPPED | OUTPATIENT
Start: 2018-01-29 | End: 2018-05-10 | Stop reason: SDUPTHER

## 2018-01-29 RX ORDER — IRBESARTAN 300 MG/1
300 TABLET ORAL DAILY
Qty: 90 TABLET | Refills: 1 | Status: SHIPPED | OUTPATIENT
Start: 2018-01-29 | End: 2018-03-28 | Stop reason: ALTCHOICE

## 2018-01-29 NOTE — PROGRESS NOTES
"Chief Complaint   Patient presents with   • Hypertension   • Heartburn       Subjective     Stephen presents to the office today to refill his medications. No medication side effects are reported.  Arterial blood pressure is stable on current therapy.  GERD symptoms are stable.  He had EGD done October 11, 2017 which showed hiatal hernia and gastritis.  His memory loss is progressing.  He might have side effects of dry mouth that is extreme from his current therapy.  Other issues recently have been some agitation.  We will add buspirone with short-term follow-up.  He has been having more issues with his prostate and urine control.  He will contact urology for that condition.    I have reviewed and updated his medications, medical history and problem list during today's office visit.        Social History   Substance Use Topics   • Smoking status: Never Smoker   • Smokeless tobacco: Never Used   • Alcohol use No       Review of Systems   Constitutional: Negative for fatigue.   Cardiovascular: Negative for chest pain.   Genitourinary:        Incontinence   Neurological:        Memory   Psychiatric/Behavioral:        See hpi       Objective   /68 (BP Location: Right arm, Patient Position: Sitting, Cuff Size: Adult)  Pulse 65  Temp 97.2 °F (36.2 °C) (Oral)   Resp 16  Ht 162.6 cm (64\")  Wt 66.7 kg (147 lb)  BMI 25.23 kg/m2  Body mass index is 25.23 kg/(m^2).  Physical Exam   Constitutional: He is cooperative. No distress.   Eyes: Conjunctivae and lids are normal.   Neck: Carotid bruit is not present. No tracheal deviation present.   Cardiovascular: Normal rate, regular rhythm and normal heart sounds.    No murmur heard.  Pulmonary/Chest: Effort normal and breath sounds normal.   Neurological: He is alert. He is not disoriented.   Skin: Skin is warm and dry.   Psychiatric: He has a normal mood and affect. His speech is normal and behavior is normal. His affect is not inappropriate. He is not agitated. He " exhibits abnormal recent memory. He is attentive.   Vitals reviewed.      Data Reviewed:        Assessment/Plan     Problem List Items Addressed This Visit        Cardiovascular and Mediastinum    Benign essential hypertension - Primary    Relevant Medications    doxazosin (CARDURA) 4 MG tablet    metoprolol succinate XL (TOPROL-XL) 50 MG 24 hr tablet    irbesartan (AVAPRO) 300 MG tablet       Respiratory    Hiatal hernia    Relevant Medications    omeprazole (PRILOSEC) 40 MG capsule       Digestive    GERD (gastroesophageal reflux disease)    Relevant Medications    omeprazole (PRILOSEC) 40 MG capsule       Other    Memory loss    Relevant Medications    rivastigmine (EXELON) 4.6 MG/24HR patch    Agitation    Relevant Medications    busPIRone (BUSPAR) 5 MG tablet          Outpatient Encounter Prescriptions as of 1/29/2018   Medication Sig Dispense Refill   • cyanocobalamin (RA VITAMIN B-12) 100 MCG tablet Take 1 tablet by mouth Daily for 180 days. 30 tablet 5   • irbesartan (AVAPRO) 300 MG tablet Take 1 tablet by mouth Daily for 180 days. 90 tablet 1   • Polyethylene Glycol 3350 (MIRALAX PO) Take by mouth.     • [DISCONTINUED] doxazosin (CARDURA) 4 MG tablet Take 1 tablet by mouth Daily for 180 days. 90 tablet 1   • [DISCONTINUED] irbesartan (AVAPRO) 300 MG tablet Take 1 tablet by mouth Daily for 180 days. 90 tablet 1   • [DISCONTINUED] metoprolol succinate XL (TOPROL-XL) 50 MG 24 hr tablet Take 1 tablet by mouth Every Night for 180 days. 90 tablet 1   • [DISCONTINUED] omeprazole (PRILOSEC) 40 MG capsule Take 1 capsule by mouth Every Night for 180 days. 90 capsule 1   • [DISCONTINUED] rivastigmine (EXELON) 4.6 MG/24HR patch APPLY 1 PATCH ON THE SKIN DAILY 90 patch 0   • busPIRone (BUSPAR) 5 MG tablet Take 1 tablet by mouth 2 (Two) Times a Day for 180 days. 180 tablet 1   • doxazosin (CARDURA) 4 MG tablet Take 1 tablet by mouth Daily. 90 tablet 1   • metoprolol succinate XL (TOPROL-XL) 50 MG 24 hr tablet Take 1  tablet by mouth Every Night. 90 tablet 1   • omeprazole (PRILOSEC) 40 MG capsule Take 1 capsule by mouth Every Night. 90 capsule 1   • rivastigmine (EXELON) 4.6 MG/24HR patch Place 1 patch on the skin Daily for 180 days. 90 patch 1     No facility-administered encounter medications on file as of 1/29/2018.        No orders of the defined types were placed in this encounter.           F/U in 6 weeks.for regular visit and Medicare Wellness Visit

## 2018-01-29 NOTE — PATIENT INSTRUCTIONS
Buspirone tablets  What is this medicine?  BUSPIRONE (byoo CHANELL horvath) is used to treat anxiety disorders.  This medicine may be used for other purposes; ask your health care provider or pharmacist if you have questions.  COMMON BRAND NAME(S): Haleigh  What should I tell my health care provider before I take this medicine?  They need to know if you have any of these conditions:  -kidney or liver disease  -an unusual or allergic reaction to buspirone, other medicines, foods, dyes, or preservatives  -pregnant or trying to get pregnant  -breast-feeding  How should I use this medicine?  Take this medicine by mouth with a glass of water. Follow the directions on the prescription label. You may take this medicine with or without food. To ensure that this medicine always works the same way for you, you should take it either always with or always without food. Take your doses at regular intervals. Do not take your medicine more often than directed. Do not stop taking except on the advice of your doctor or health care professional.  Talk to your pediatrician regarding the use of this medicine in children. Special care may be needed.  Overdosage: If you think you have taken too much of this medicine contact a poison control center or emergency room at once.  NOTE: This medicine is only for you. Do not share this medicine with others.  What if I miss a dose?  If you miss a dose, take it as soon as you can. If it is almost time for your next dose, take only that dose. Do not take double or extra doses.  What may interact with this medicine?  Do not take this medicine with any of the following medications:  -linezolid  -MAOIs like Carbex, Eldepryl, Marplan, Nardil, and Parnate  -methylene blue  -procarbazine  This medicine may also interact with the following medications:  -diazepam  -digoxin  -diltiazem  -erythromycin  -grapefruit juice  -haloperidol  -medicines for mental depression or mood problems  -medicines for seizures like  carbamazepine, phenobarbital and phenytoin  -nefazodone  -other medications for anxiety  -rifampin  -ritonavir  -some antifungal medicines like itraconazole, ketoconazole, and voriconazole  -verapamil  -warfarin  This list may not describe all possible interactions. Give your health care provider a list of all the medicines, herbs, non-prescription drugs, or dietary supplements you use. Also tell them if you smoke, drink alcohol, or use illegal drugs. Some items may interact with your medicine.  What should I watch for while using this medicine?  Visit your doctor or health care professional for regular checks on your progress. It may take 1 to 2 weeks before your anxiety gets better.  You may get drowsy or dizzy. Do not drive, use machinery, or do anything that needs mental alertness until you know how this drug affects you. Do not stand or sit up quickly, especially if you are an older patient. This reduces the risk of dizzy or fainting spells. Alcohol can make you more drowsy and dizzy. Avoid alcoholic drinks.  What side effects may I notice from receiving this medicine?  Side effects that you should report to your doctor or health care professional as soon as possible:  -blurred vision or other vision changes  -chest pain  -confusion  -difficulty breathing  -feelings of hostility or anger  -muscle aches and pains  -numbness or tingling in hands or feet  -ringing in the ears  -skin rash and itching  -vomiting  -weakness  Side effects that usually do not require medical attention (report to your doctor or health care professional if they continue or are bothersome):  -disturbed dreams, nightmares  -headache  -nausea  -restlessness or nervousness  -sore throat and nasal congestion  -stomach upset  This list may not describe all possible side effects. Call your doctor for medical advice about side effects. You may report side effects to FDA at 5-385-FDA-3435.  Where should I keep my medicine?  Keep out of the reach  of children.  Store at room temperature below 30 degrees C (86 degrees F). Protect from light. Keep container tightly closed. Throw away any unused medicine after the expiration date.  NOTE: This sheet is a summary. It may not cover all possible information. If you have questions about this medicine, talk to your doctor, pharmacist, or health care provider.  © 2018 [employer]vier/Gold Standard (2011-07-28 18:06:11)

## 2018-03-05 LAB
ALBUMIN SERPL-MCNC: 3.9 G/DL (ref 3.5–5.2)
ALBUMIN/GLOB SERPL: 1.8 G/DL
ALP SERPL-CCNC: 51 U/L (ref 39–117)
ALT SERPL-CCNC: 12 U/L (ref 1–41)
AST SERPL-CCNC: 20 U/L (ref 1–40)
BASOPHILS # BLD AUTO: 0.01 10*3/MM3 (ref 0–0.2)
BASOPHILS NFR BLD AUTO: 0.2 % (ref 0–1.5)
BILIRUB SERPL-MCNC: 0.8 MG/DL (ref 0.1–1.2)
BUN SERPL-MCNC: 16 MG/DL (ref 8–23)
BUN/CREAT SERPL: 19 (ref 7–25)
CALCIUM SERPL-MCNC: 8.9 MG/DL (ref 8.6–10.5)
CHLORIDE SERPL-SCNC: 104 MMOL/L (ref 98–107)
CO2 SERPL-SCNC: 29.4 MMOL/L (ref 22–29)
CREAT SERPL-MCNC: 0.84 MG/DL (ref 0.76–1.27)
EOSINOPHIL # BLD AUTO: 0.09 10*3/MM3 (ref 0–0.7)
EOSINOPHIL NFR BLD AUTO: 1.6 % (ref 0.3–6.2)
ERYTHROCYTE [DISTWIDTH] IN BLOOD BY AUTOMATED COUNT: 13.2 % (ref 11.5–14.5)
GFR SERPLBLD CREATININE-BSD FMLA CKD-EPI: 105 ML/MIN/1.73
GFR SERPLBLD CREATININE-BSD FMLA CKD-EPI: 86 ML/MIN/1.73
GLOBULIN SER CALC-MCNC: 2.2 GM/DL
GLUCOSE SERPL-MCNC: 93 MG/DL (ref 65–99)
HCT VFR BLD AUTO: 39.8 % (ref 40.4–52.2)
HGB BLD-MCNC: 12.8 G/DL (ref 13.7–17.6)
IMM GRANULOCYTES # BLD: 0 10*3/MM3 (ref 0–0.03)
IMM GRANULOCYTES NFR BLD: 0 % (ref 0–0.5)
LYMPHOCYTES # BLD AUTO: 1.25 10*3/MM3 (ref 0.9–4.8)
LYMPHOCYTES NFR BLD AUTO: 22.1 % (ref 19.6–45.3)
MCH RBC QN AUTO: 30.2 PG (ref 27–32.7)
MCHC RBC AUTO-ENTMCNC: 32.2 G/DL (ref 32.6–36.4)
MCV RBC AUTO: 93.9 FL (ref 79.8–96.2)
MONOCYTES # BLD AUTO: 0.36 10*3/MM3 (ref 0.2–1.2)
MONOCYTES NFR BLD AUTO: 6.4 % (ref 5–12)
NEUTROPHILS # BLD AUTO: 3.95 10*3/MM3 (ref 1.9–8.1)
NEUTROPHILS NFR BLD AUTO: 69.7 % (ref 42.7–76)
PLATELET # BLD AUTO: 172 10*3/MM3 (ref 140–500)
POTASSIUM SERPL-SCNC: 4.5 MMOL/L (ref 3.5–5.2)
PROT SERPL-MCNC: 6.1 G/DL (ref 6–8.5)
RBC # BLD AUTO: 4.24 10*6/MM3 (ref 4.6–6)
SODIUM SERPL-SCNC: 142 MMOL/L (ref 136–145)
TSH SERPL DL<=0.005 MIU/L-ACNC: 2.04 MIU/ML (ref 0.27–4.2)
WBC # BLD AUTO: 5.66 10*3/MM3 (ref 4.5–10.7)

## 2018-03-12 ENCOUNTER — OFFICE VISIT (OUTPATIENT)
Dept: FAMILY MEDICINE CLINIC | Facility: CLINIC | Age: 83
End: 2018-03-12

## 2018-03-12 VITALS
TEMPERATURE: 97.3 F | BODY MASS INDEX: 27.66 KG/M2 | WEIGHT: 162 LBS | DIASTOLIC BLOOD PRESSURE: 79 MMHG | HEIGHT: 64 IN | HEART RATE: 64 BPM | SYSTOLIC BLOOD PRESSURE: 163 MMHG | RESPIRATION RATE: 16 BRPM

## 2018-03-12 DIAGNOSIS — R45.1 AGITATION: ICD-10-CM

## 2018-03-12 DIAGNOSIS — R41.3 MEMORY LOSS: Primary | ICD-10-CM

## 2018-03-12 PROCEDURE — 99213 OFFICE O/P EST LOW 20 MIN: CPT | Performed by: FAMILY MEDICINE

## 2018-03-12 RX ORDER — BUSPIRONE HYDROCHLORIDE 10 MG/1
10 TABLET ORAL
Qty: 180 TABLET | Refills: 1 | Status: SHIPPED | OUTPATIENT
Start: 2018-03-12 | End: 2018-04-11

## 2018-03-12 RX ORDER — CHOLECALCIFEROL (VITAMIN D3) 125 MCG
CAPSULE ORAL
COMMUNITY
End: 2018-05-10

## 2018-03-12 NOTE — PROGRESS NOTES
"Chief Complaint   Patient presents with   • Anxiety       Subjective   This patient returns the office to follow-up on agitation and memory loss.  Buspirone has had minimal effectiveness.  Mild drowsiness with the medication.  We will increase dose and keep are follow-up in early July.  Wife accompanies patient to the visit today she is asking for handicap sticker and I agree with this.  I have reviewed and updated his medications, medical history and problem list during today's office visit.       Assessment/Plan     Problem List Items Addressed This Visit        Other    Memory loss - Primary    Agitation    Relevant Medications    busPIRone (BUSPAR) 10 MG tablet      Other Visit Diagnoses    None.         No orders of the defined types were placed in this encounter.      F/U in 3 months         Review of Systems   Neurological: Positive for memory problem.   Psychiatric/Behavioral: Positive for agitation.     Objective   /79   Pulse 64   Temp 97.3 °F (36.3 °C) (Oral)   Resp 16   Ht 162.6 cm (64\")   Wt 73.5 kg (162 lb)   BMI 27.81 kg/m²   Body mass index is 27.81 kg/m².  Physical Exam   Constitutional: He is oriented to person, place, and time. He appears well-developed. No distress.   Eyes: Conjunctivae are normal.   Cardiovascular: Normal rate and regular rhythm.  Exam reveals no gallop and no friction rub.    No murmur heard.  Pulmonary/Chest: Effort normal and breath sounds normal.   Neurological: He is alert and oriented to person, place, and time.   Skin: Skin is warm and dry.   Psychiatric: He has a normal mood and affect. His behavior is normal.   Vitals reviewed.       Social History   Substance Use Topics   • Smoking status: Never Smoker   • Smokeless tobacco: Never Used   • Alcohol use No       Data Reviewed:    No radiology results for the last 30 days.    JWAMBULATORYLABS:   Lab Results   Component Value Date    GLU 93 03/05/2018    BUN 16 03/05/2018    CREATININE 0.84 03/05/2018    " EGFRIFNONA 86 03/05/2018    EGFRIFAFRI 105 03/05/2018     03/05/2018    K 4.5 03/05/2018     03/05/2018    CALCIUM 8.9 03/05/2018    PROTENTOTREF 6.1 03/05/2018    ALBUMIN 3.90 03/05/2018    LABGLOBREF 2.2 03/05/2018    BILITOT 0.8 03/05/2018    ALKPHOS 51 03/05/2018    AST 20 03/05/2018    ALT 12 03/05/2018     CBC w/ diff:   Lab Results   Component Value Date    WBC 5.66 03/05/2018    RBC 4.24 (L) 03/05/2018    HGB 12.8 (L) 03/05/2018    HCT 39.8 (L) 03/05/2018    MCV 93.9 03/05/2018    MCH 30.2 03/05/2018    MCHC 32.2 (L) 03/05/2018    RDW 13.2 03/05/2018     03/05/2018    NEUTRORELPCT 69.7 03/05/2018    LYMPHORELPCT 22.1 03/05/2018    MONORELPCT 6.4 03/05/2018    EOSRELPCT 1.6 03/05/2018    BASORELPCT 0.2 03/05/2018     LIPID PANEL:  Lab Results   Component Value Date    CHLPL 145 08/15/2017    TRIG 86 08/15/2017    HDL 46 08/15/2017    VLDL 17.2 08/15/2017    LDL 82 08/15/2017    LDLHDL 1.78 08/15/2017     TSH:  Lab Results   Component Value Date    TSH 2.040 03/05/2018

## 2018-03-14 ENCOUNTER — TELEPHONE (OUTPATIENT)
Dept: FAMILY MEDICINE CLINIC | Facility: CLINIC | Age: 83
End: 2018-03-14

## 2018-03-14 RX ORDER — FUROSEMIDE 20 MG/1
20 TABLET ORAL DAILY
Qty: 30 TABLET | Refills: 1 | Status: SHIPPED | OUTPATIENT
Start: 2018-03-14 | End: 2018-03-28 | Stop reason: SINTOL

## 2018-03-14 NOTE — TELEPHONE ENCOUNTER
Pt legs a swelling. S/w Dr. Hardwick ok to send RX for lasix 20 mg QD with a follow up appointment in 2 weeks.  Pt's wife advised.

## 2018-03-28 ENCOUNTER — OFFICE VISIT (OUTPATIENT)
Dept: FAMILY MEDICINE CLINIC | Facility: CLINIC | Age: 83
End: 2018-03-28

## 2018-03-28 VITALS
DIASTOLIC BLOOD PRESSURE: 88 MMHG | BODY MASS INDEX: 26.98 KG/M2 | HEIGHT: 64 IN | TEMPERATURE: 97.4 F | RESPIRATION RATE: 16 BRPM | SYSTOLIC BLOOD PRESSURE: 164 MMHG | WEIGHT: 158 LBS | HEART RATE: 92 BPM

## 2018-03-28 DIAGNOSIS — R60.1 GENERALIZED EDEMA: ICD-10-CM

## 2018-03-28 DIAGNOSIS — I10 BENIGN ESSENTIAL HYPERTENSION: Primary | ICD-10-CM

## 2018-03-28 DIAGNOSIS — C61 PROSTATE CANCER (HCC): ICD-10-CM

## 2018-03-28 DIAGNOSIS — R41.3 MEMORY LOSS: ICD-10-CM

## 2018-03-28 DIAGNOSIS — N39.498 OTHER URINARY INCONTINENCE: ICD-10-CM

## 2018-03-28 PROBLEM — R32 URINARY INCONTINENCE: Status: ACTIVE | Noted: 2018-03-28

## 2018-03-28 PROCEDURE — 99214 OFFICE O/P EST MOD 30 MIN: CPT | Performed by: FAMILY MEDICINE

## 2018-03-28 RX ORDER — SOLIFENACIN SUCCINATE 5 MG/1
5 TABLET, FILM COATED ORAL DAILY
COMMUNITY
End: 2018-04-11

## 2018-03-28 RX ORDER — SPIRONOLACTONE 25 MG/1
25 TABLET ORAL DAILY
Qty: 30 TABLET | Refills: 1 | Status: SHIPPED | OUTPATIENT
Start: 2018-03-28 | End: 2018-05-10 | Stop reason: SDUPTHER

## 2018-03-28 RX ORDER — NIFEDIPINE 60 MG/1
60 TABLET, EXTENDED RELEASE ORAL DAILY
Qty: 30 TABLET | Refills: 1 | Status: SHIPPED | OUTPATIENT
Start: 2018-03-28 | End: 2018-05-10 | Stop reason: SDUPTHER

## 2018-03-28 NOTE — PROGRESS NOTES
Chief Complaint   Patient presents with   • Hypertension   • Leg Swelling   • Other     Discuss Homehealth       Subjective   BP is high. Edema for 2 weeks since starting vesicare. He has incontinence. Wife is getting worn out and requests home health assistance. Pt can't dress himself without assistance. He is not sleeping. The buspar is not effective. Pt is having anger episodes and is prone to throw things. More problems following instructions.   I have reviewed and updated his medications, medical history and problem list during today's office visit.       Assessment/Plan     Problem List Items Addressed This Visit        Cardiovascular and Mediastinum    Benign essential hypertension - Primary    Relevant Medications    NIFEdipine XL (PROCARDIA XL) 60 MG 24 hr tablet    spironolactone (ALDACTONE) 25 MG tablet    Other Relevant Orders    Ambulatory Referral to Home Health    Comprehensive Metabolic Panel    CBC & Differential    Urinalysis With Microscopic - Urine, Clean Catch       Genitourinary    Prostate cancer    Relevant Orders    Ambulatory Referral to Home Health       Other    Memory loss    Relevant Orders    Ambulatory Referral to Home Health    Generalized edema    Relevant Orders    Ambulatory Referral to Home Health    proBNP    Urinalysis With Microscopic - Urine, Clean Catch    Urinary incontinence    Relevant Orders    Ambulatory Referral to Home Health      Other Visit Diagnoses    None.         Orders Placed This Encounter   Procedures   • Comprehensive Metabolic Panel   • proBNP   • Ambulatory Referral to Home Health     Referral Priority:   Urgent     Referral Type:   Home Health     Referral Reason:   Specialty Services Required     Requested Specialty:   Home Health Services     Number of Visits Requested:   1   • CBC & Differential     Order Specific Question:   Manual Differential     Answer:   No   • Urinalysis With Microscopic - Urine, Clean Catch       F/U in two weeks         Review  "of Systems   Genitourinary:        See HPI   Neurological: Positive for memory problem and confusion.        See HPI     Objective   /88   Pulse 92   Temp 97.4 °F (36.3 °C) (Oral)   Resp 16   Ht 162.6 cm (64\")   Wt 71.7 kg (158 lb)   BMI 27.12 kg/m²   Body mass index is 27.12 kg/m².  Physical Exam   Constitutional: He is oriented to person, place, and time. He appears well-developed. No distress.   Eyes: Conjunctivae and lids are normal.   Neck: Carotid bruit is not present.   Cardiovascular: Normal rate, regular rhythm and normal heart sounds.    Pulmonary/Chest: Effort normal and breath sounds normal.   Neurological: He is alert and oriented to person, place, and time.   Skin: Skin is warm and dry.   Psychiatric: He has a normal mood and affect. His behavior is normal.   Vitals reviewed.       Social History   Substance Use Topics   • Smoking status: Never Smoker   • Smokeless tobacco: Never Used   • Alcohol use No       Data Reviewed:                     "

## 2018-03-30 ENCOUNTER — TELEPHONE (OUTPATIENT)
Dept: FAMILY MEDICINE CLINIC | Facility: CLINIC | Age: 83
End: 2018-03-30

## 2018-04-11 ENCOUNTER — OFFICE VISIT (OUTPATIENT)
Dept: FAMILY MEDICINE CLINIC | Facility: CLINIC | Age: 83
End: 2018-04-11

## 2018-04-11 VITALS
SYSTOLIC BLOOD PRESSURE: 131 MMHG | HEART RATE: 79 BPM | HEIGHT: 64 IN | WEIGHT: 160 LBS | TEMPERATURE: 97 F | DIASTOLIC BLOOD PRESSURE: 74 MMHG | RESPIRATION RATE: 16 BRPM | BODY MASS INDEX: 27.31 KG/M2

## 2018-04-11 DIAGNOSIS — R45.1 AGITATION: ICD-10-CM

## 2018-04-11 DIAGNOSIS — I10 BENIGN ESSENTIAL HYPERTENSION: ICD-10-CM

## 2018-04-11 DIAGNOSIS — R60.1 GENERALIZED EDEMA: Primary | ICD-10-CM

## 2018-04-11 DIAGNOSIS — N39.498 OTHER URINARY INCONTINENCE: ICD-10-CM

## 2018-04-11 DIAGNOSIS — G47.09 OTHER INSOMNIA: ICD-10-CM

## 2018-04-11 DIAGNOSIS — R05.8 NOCTURNAL COUGH: ICD-10-CM

## 2018-04-11 DIAGNOSIS — R41.3 MEMORY LOSS: ICD-10-CM

## 2018-04-11 PROCEDURE — 99214 OFFICE O/P EST MOD 30 MIN: CPT | Performed by: FAMILY MEDICINE

## 2018-04-11 RX ORDER — TROSPIUM CHLORIDE ER 60 MG/1
CAPSULE ORAL
COMMUNITY
Start: 2018-03-28 | End: 2018-04-11

## 2018-04-11 RX ORDER — MIRTAZAPINE 7.5 MG/1
7.5 TABLET, FILM COATED ORAL NIGHTLY
Qty: 30 TABLET | Refills: 1 | Status: SHIPPED | OUTPATIENT
Start: 2018-04-11 | End: 2018-05-10

## 2018-04-11 NOTE — PATIENT INSTRUCTIONS
"    Low-Sodium Eating Plan  Sodium, which is an element that makes up salt, helps you maintain a healthy balance of fluids in your body. Too much sodium can increase your blood pressure and cause fluid and waste to be held in your body.  Your health care provider or dietitian may recommend following this plan if you have high blood pressure (hypertension), kidney disease, liver disease, or heart failure. Eating less sodium can help lower your blood pressure, reduce swelling, and protect your heart, liver, and kidneys.  What are tips for following this plan?  General guidelines   · Most people on this plan should limit their sodium intake to 1,500-2,000 mg (milligrams) of sodium each day.  Reading food labels   · The Nutrition Facts label lists the amount of sodium in one serving of the food. If you eat more than one serving, you must multiply the listed amount of sodium by the number of servings.  · Choose foods with less than 140 mg of sodium per serving.  · Avoid foods with 300 mg of sodium or more per serving.  Shopping   · Look for lower-sodium products, often labeled as \"low-sodium\" or \"no salt added.\"  · Always check the sodium content even if foods are labeled as \"unsalted\" or \"no salt added\".  · Buy fresh foods.  ¨ Avoid canned foods and premade or frozen meals.  ¨ Avoid canned, cured, or processed meats  · Buy breads that have less than 80 mg of sodium per slice.  Cooking   · Eat more home-cooked food and less restaurant, buffet, and fast food.  · Avoid adding salt when cooking. Use salt-free seasonings or herbs instead of table salt or sea salt. Check with your health care provider or pharmacist before using salt substitutes.  · Cook with plant-based oils, such as canola, sunflower, or olive oil.  Meal planning   · When eating at a restaurant, ask that your food be prepared with less salt or no salt, if possible.  · Avoid foods that contain MSG (monosodium glutamate). MSG is sometimes added to Chinese " "food, bouillon, and some canned foods.  What foods are recommended?  The items listed may not be a complete list. Talk with your dietitian about what dietary choices are best for you.  Grains   Low-sodium cereals, including oats, puffed wheat and rice, and shredded wheat. Low-sodium crackers. Unsalted rice. Unsalted pasta. Low-sodium bread. Whole-grain breads and whole-grain pasta.  Vegetables   Fresh or frozen vegetables. \"No salt added\" canned vegetables. \"No salt added\" tomato sauce and paste. Low-sodium or reduced-sodium tomato and vegetable juice.  Fruits   Fresh, frozen, or canned fruit. Fruit juice.  Meats and other protein foods   Fresh or frozen (no salt added) meat, poultry, seafood, and fish. Low-sodium canned tuna and salmon. Unsalted nuts. Dried peas, beans, and lentils without added salt. Unsalted canned beans. Eggs. Unsalted nut butters.  Dairy   Milk. Soy milk. Cheese that is naturally low in sodium, such as ricotta cheese, fresh mozzarella, or Swiss cheese Low-sodium or reduced-sodium cheese. Cream cheese. Yogurt.  Fats and oils   Unsalted butter. Unsalted margarine with no trans fat. Vegetable oils such as canola or olive oils.  Seasonings and other foods   Fresh and dried herbs and spices. Salt-free seasonings. Low-sodium mustard and ketchup. Sodium-free salad dressing. Sodium-free light mayonnaise. Fresh or refrigerated horseradish. Lemon juice. Vinegar. Homemade, reduced-sodium, or low-sodium soups. Unsalted popcorn and pretzels. Low-salt or salt-free chips.  What foods are not recommended?  The items listed may not be a complete list. Talk with your dietitian about what dietary choices are best for you.  Grains   Instant hot cereals. Bread stuffing, pancake, and biscuit mixes. Croutons. Seasoned rice or pasta mixes. Noodle soup cups. Boxed or frozen macaroni and cheese. Regular salted crackers. Self-rising flour.  Vegetables   Sauerkraut, pickled vegetables, and relishes. Olives. French fries. " Onion rings. Regular canned vegetables (not low-sodium or reduced-sodium). Regular canned tomato sauce and paste (not low-sodium or reduced-sodium). Regular tomato and vegetable juice (not low-sodium or reduced-sodium). Frozen vegetables in sauces.  Meats and other protein foods   Meat or fish that is salted, canned, smoked, spiced, or pickled. Altman, ham, sausage, hotdogs, corned beef, chipped beef, packaged lunch meats, salt pork, jerky, pickled herring, anchovies, regular canned tuna, sardines, salted nuts.  Dairy   Processed cheese and cheese spreads. Cheese curds. Blue cheese. Feta cheese. String cheese. Regular cottage cheese. Buttermilk. Canned milk.  Fats and oils   Salted butter. Regular margarine. Ghee. Altman fat.  Seasonings and other foods   Onion salt, garlic salt, seasoned salt, table salt, and sea salt. Canned and packaged gravies. Worcestershire sauce. Tartar sauce. Barbecue sauce. Teriyaki sauce. Soy sauce, including reduced-sodium. Steak sauce. Fish sauce. Oyster sauce. Cocktail sauce. Horseradish that you find on the shelf. Regular ketchup and mustard. Meat flavorings and tenderizers. Bouillon cubes. Hot sauce and Tabasco sauce. Premade or packaged marinades. Premade or packaged taco seasonings. Relishes. Regular salad dressings. Salsa. Potato and tortilla chips. Corn chips and puffs. Salted popcorn and pretzels. Canned or dried soups. Pizza. Frozen entrees and pot pies.  Summary  · Eating less sodium can help lower your blood pressure, reduce swelling, and protect your heart, liver, and kidneys.  · Most people on this plan should limit their sodium intake to 1,500-2,000 mg (milligrams) of sodium each day.  · Canned, boxed, and frozen foods are high in sodium. Restaurant foods, fast foods, and pizza are also very high in sodium. You also get sodium by adding salt to food.  · Try to cook at home, eat more fresh fruits and vegetables, and eat less fast food, canned, processed, or prepared  foods.  This information is not intended to replace advice given to you by your health care provider. Make sure you discuss any questions you have with your health care provider.  Document Released: 06/09/2003 Document Revised: 12/11/2017 Document Reviewed: 12/11/2017  [employer] Interactive Patient Education © 2017 [employer]

## 2018-04-11 NOTE — PROGRESS NOTES
"Chief Complaint   Patient presents with   • Leg Swelling       Subjective   Patient presents the office with ongoing leg edema.  There has been no change with recent medications being discontinued.  He has noticed worsening urinary incontinence since being off Vesicare.  Blood pressure is improved on spironolactone.  The buspirone and continues to not be very effective for his agitation symptoms.  We will switch to mirtazapine.  Hopefully this new medicine will help with his sleep.  Melatonin is not very effective.  I have reviewed and updated his medications, medical history and problem list during today's office visit.       Social History   Substance Use Topics   • Smoking status: Never Smoker   • Smokeless tobacco: Never Used   • Alcohol use No     Review of Systems   Respiratory: Positive for cough.    Genitourinary: Positive for urinary incontinence.   Psychiatric/Behavioral: Positive for agitation.     Objective   /74   Pulse 79   Temp 97 °F (36.1 °C) (Oral)   Resp 16   Ht 162.6 cm (64\")   Wt 72.6 kg (160 lb)   BMI 27.46 kg/m²   Body mass index is 27.46 kg/m².  Physical Exam   Constitutional: He is oriented to person, place, and time. He appears well-developed. No distress.   Eyes: Conjunctivae and lids are normal.   Neck: Carotid bruit is not present.   Cardiovascular: Normal rate, regular rhythm and normal heart sounds.    Pulmonary/Chest: Effort normal. He has rhonchi (scattered).   Musculoskeletal: He exhibits edema (pitting).   Neurological: He is alert and oriented to person, place, and time.   Skin: Skin is warm and dry.   Psychiatric: He has a normal mood and affect. His behavior is normal.   Vitals reviewed.       Data Reviewed:         Lab Results   Component Value Date    GLU 93 03/05/2018    BUN 16 03/05/2018    CREATININE 0.84 03/05/2018    EGFRIFNONA 86 03/05/2018    EGFRIFAFRI 105 03/05/2018     03/05/2018    K 4.5 03/05/2018     03/05/2018    CALCIUM 8.9 03/05/2018    " PROTENTOTREF 6.1 03/05/2018    ALBUMIN 3.90 03/05/2018    LABGLOBREF 2.2 03/05/2018    BILITOT 0.8 03/05/2018    ALKPHOS 51 03/05/2018    AST 20 03/05/2018    ALT 12 03/05/2018     CBC w/ diff:   Lab Results   Component Value Date    WBC 5.66 03/05/2018    RBC 4.24 (L) 03/05/2018    HGB 12.8 (L) 03/05/2018    HCT 39.8 (L) 03/05/2018    MCV 93.9 03/05/2018    MCH 30.2 03/05/2018    MCHC 32.2 (L) 03/05/2018    RDW 13.2 03/05/2018     03/05/2018    NEUTRORELPCT 69.7 03/05/2018    LYMPHORELPCT 22.1 03/05/2018    MONORELPCT 6.4 03/05/2018    EOSRELPCT 1.6 03/05/2018    BASORELPCT 0.2 03/05/2018     LIPID PANEL:  Lab Results   Component Value Date    CHLPL 145 08/15/2017    TRIG 86 08/15/2017    HDL 46 08/15/2017    VLDL 17.2 08/15/2017    LDL 82 08/15/2017    LDLHDL 1.78 08/15/2017     TSH:  Lab Results   Component Value Date    TSH 2.040 03/05/2018       Assessment/Plan   Problem List Items Addressed This Visit        Cardiovascular and Mediastinum    Benign essential hypertension       Respiratory    Nocturnal cough       Other    Memory loss    Agitation    Relevant Medications    mirtazapine (REMERON) 7.5 MG tablet    Generalized edema - Primary    Relevant Orders    proBNP    Adult Transthoracic Echo Limited W/ Cont if Necessary Per Protocol    Ambulatory Referral to Cardiology    Urinary incontinence    Other insomnia    Relevant Medications    mirtazapine (REMERON) 7.5 MG tablet      Other Visit Diagnoses    None.       Orders Placed This Encounter   Procedures   • proBNP   • Ambulatory Referral to Cardiology     Referral Priority:   Routine     Referral Type:   Consultation     Referral Reason:   Specialty Services Required     Requested Specialty:   Cardiology     Number of Visits Requested:   1   • Adult Transthoracic Echo Limited W/ Cont if Necessary Per Protocol     Order Specific Question:   Reason for exam?     Answer:   Other Reasons     Comments:   edema both legs, nocturnal cough     Order  Specific Question:   Other reason(s)?     Answer:   Additional Reasons     Order Specific Question:   Additional reason(s)?     Answer:   Reasons Uncertain in Most Circumstances     F/U in two weeks

## 2018-04-12 LAB — NT-PROBNP SERPL-MCNC: 268 PG/ML (ref 0–486)

## 2018-04-12 NOTE — PROGRESS NOTES
Please let patient know that lab is not suggestive of chf. Set up appointment with vascular surgeon to look at edema. Tell him to keep appointment with cardiology as well.                                                         Dr. Hardwick

## 2018-04-23 PROBLEM — K29.70 GASTRITIS, UNSPECIFIED, WITHOUT BLEEDING: Status: ACTIVE | Noted: 2017-11-07

## 2018-04-23 PROBLEM — K44.9 DIAPHRAGMATIC HERNIA WITHOUT OBSTRUCTION OR GANGRENE: Status: ACTIVE | Noted: 2017-11-07

## 2018-04-23 PROBLEM — K57.10 DVRTCLOS OF SM INT W/O PERFORATION OR ABSCESS W/O BLEEDING: Status: ACTIVE | Noted: 2017-11-07

## 2018-04-24 ENCOUNTER — TELEPHONE (OUTPATIENT)
Dept: FAMILY MEDICINE CLINIC | Facility: CLINIC | Age: 83
End: 2018-04-24

## 2018-04-24 DIAGNOSIS — R60.1 GENERALIZED EDEMA: Primary | ICD-10-CM

## 2018-04-24 NOTE — TELEPHONE ENCOUNTER
----- Message from Oli Hardwick MD sent at 4/12/2018  5:39 PM EDT -----  Please let patient know that lab is not suggestive of chf. Set up appointment with vascular surgeon to look at edema. Tell him to keep appointment with cardiology as well.                                                         Dr. Hardwick

## 2018-04-26 ENCOUNTER — HOSPITAL ENCOUNTER (OUTPATIENT)
Dept: CARDIOLOGY | Facility: HOSPITAL | Age: 83
Discharge: HOME | End: 2018-04-26
Attending: FAMILY MEDICINE | Admitting: FAMILY MEDICINE

## 2018-04-26 ENCOUNTER — APPOINTMENT (OUTPATIENT)
Dept: LAB | Facility: HOSPITAL | Age: 83
End: 2018-04-26

## 2018-04-26 VITALS — HEIGHT: 64 IN | BODY MASS INDEX: 27.31 KG/M2 | WEIGHT: 160 LBS

## 2018-04-26 LAB
ASCENDING AORTA: 2.9 CM
BH CV ECHO MEAS - ACS: 1.6 CM
BH CV ECHO MEAS - AO MAX PG (FULL): 12 MMHG
BH CV ECHO MEAS - AO MAX PG: 17 MMHG
BH CV ECHO MEAS - AO MEAN PG (FULL): 7.7 MMHG
BH CV ECHO MEAS - AO MEAN PG: 10 MMHG
BH CV ECHO MEAS - AO ROOT AREA (BSA CORRECTED): 1.7
BH CV ECHO MEAS - AO ROOT AREA: 6.8 CM^2
BH CV ECHO MEAS - AO ROOT DIAM: 2.9 CM
BH CV ECHO MEAS - AO V2 MAX: 206 CM/SEC
BH CV ECHO MEAS - AO V2 MEAN: 147 CM/SEC
BH CV ECHO MEAS - AO V2 VTI: 42.9 CM
BH CV ECHO MEAS - AVA(I,A): 1.8 CM^2
BH CV ECHO MEAS - AVA(I,D): 1.8 CM^2
BH CV ECHO MEAS - AVA(V,A): 1.8 CM^2
BH CV ECHO MEAS - AVA(V,D): 1.8 CM^2
BH CV ECHO MEAS - BSA(HAYCOCK): 1.8 M^2
BH CV ECHO MEAS - BSA: 1.8 M^2
BH CV ECHO MEAS - BZI_BMI: 27.5 KILOGRAMS/M^2
BH CV ECHO MEAS - BZI_METRIC_HEIGHT: 162.6 CM
BH CV ECHO MEAS - BZI_METRIC_WEIGHT: 72.6 KG
BH CV ECHO MEAS - CONTRAST EF (2CH): 69.7 ML/M^2
BH CV ECHO MEAS - CONTRAST EF 4CH: 56.9 ML/M^2
BH CV ECHO MEAS - EDV(MOD-SP2): 66 ML
BH CV ECHO MEAS - EDV(MOD-SP4): 65 ML
BH CV ECHO MEAS - EDV(TEICH): 97.2 ML
BH CV ECHO MEAS - EF(CUBED): 81 %
BH CV ECHO MEAS - EF(MOD-BP): 65 %
BH CV ECHO MEAS - EF(MOD-SP2): 69.7 %
BH CV ECHO MEAS - EF(MOD-SP4): 56.9 %
BH CV ECHO MEAS - EF(TEICH): 73.6 %
BH CV ECHO MEAS - ESV(MOD-SP2): 20 ML
BH CV ECHO MEAS - ESV(MOD-SP4): 28 ML
BH CV ECHO MEAS - ESV(TEICH): 25.7 ML
BH CV ECHO MEAS - FS: 42.5 %
BH CV ECHO MEAS - IVS/LVPW: 0.93
BH CV ECHO MEAS - IVSD: 1.1 CM
BH CV ECHO MEAS - LAT PEAK E' VEL: 9 CM/SEC
BH CV ECHO MEAS - LV DIASTOLIC VOL/BSA (35-75): 36.5 ML/M^2
BH CV ECHO MEAS - LV MASS(C)D: 180.7 GRAMS
BH CV ECHO MEAS - LV MASS(C)DI: 101.6 GRAMS/M^2
BH CV ECHO MEAS - LV MAX PG: 5 MMHG
BH CV ECHO MEAS - LV MEAN PG: 2.3 MMHG
BH CV ECHO MEAS - LV SYSTOLIC VOL/BSA (12-30): 15.7 ML/M^2
BH CV ECHO MEAS - LV V1 MAX: 111.6 CM/SEC
BH CV ECHO MEAS - LV V1 MEAN: 68.9 CM/SEC
BH CV ECHO MEAS - LV V1 VTI: 22.8 CM
BH CV ECHO MEAS - LVIDD: 4.6 CM
BH CV ECHO MEAS - LVIDS: 2.6 CM
BH CV ECHO MEAS - LVLD AP2: 7.2 CM
BH CV ECHO MEAS - LVLD AP4: 7 CM
BH CV ECHO MEAS - LVLS AP2: 5.7 CM
BH CV ECHO MEAS - LVLS AP4: 5.6 CM
BH CV ECHO MEAS - LVOT AREA (M): 3.5 CM^2
BH CV ECHO MEAS - LVOT AREA: 3.4 CM^2
BH CV ECHO MEAS - LVOT DIAM: 2.1 CM
BH CV ECHO MEAS - LVPWD: 1.1 CM
BH CV ECHO MEAS - MED PEAK E' VEL: 6 CM/SEC
BH CV ECHO MEAS - MR MAX PG: 20.9 MMHG
BH CV ECHO MEAS - MR MAX VEL: 228.6 CM/SEC
BH CV ECHO MEAS - MV A DUR: 0.15 SEC
BH CV ECHO MEAS - MV A MAX VEL: 137.1 CM/SEC
BH CV ECHO MEAS - MV DEC SLOPE: 410.2 CM/SEC^2
BH CV ECHO MEAS - MV DEC TIME: 0.16 SEC
BH CV ECHO MEAS - MV E MAX VEL: 66.2 CM/SEC
BH CV ECHO MEAS - MV E/A: 0.48
BH CV ECHO MEAS - MV MAX PG: 6.6 MMHG
BH CV ECHO MEAS - MV MEAN PG: 2.7 MMHG
BH CV ECHO MEAS - MV P1/2T MAX VEL: 70.1 CM/SEC
BH CV ECHO MEAS - MV P1/2T: 50.1 MSEC
BH CV ECHO MEAS - MV V2 MAX: 128.1 CM/SEC
BH CV ECHO MEAS - MV V2 MEAN: 75.9 CM/SEC
BH CV ECHO MEAS - MV V2 VTI: 29.9 CM
BH CV ECHO MEAS - MVA P1/2T LCG: 3.1 CM^2
BH CV ECHO MEAS - MVA(P1/2T): 4.4 CM^2
BH CV ECHO MEAS - MVA(VTI): 2.6 CM^2
BH CV ECHO MEAS - PA MAX PG (FULL): 2.9 MMHG
BH CV ECHO MEAS - PA MAX PG: 4.4 MMHG
BH CV ECHO MEAS - PA V2 MAX: 104.5 CM/SEC
BH CV ECHO MEAS - PULM A REVS DUR: 0.15 SEC
BH CV ECHO MEAS - PULM A REVS VEL: 43.5 CM/SEC
BH CV ECHO MEAS - PULM DIAS VEL: 68.7 CM/SEC
BH CV ECHO MEAS - PULM S/D: 0.89
BH CV ECHO MEAS - PULM SYS VEL: 61.2 CM/SEC
BH CV ECHO MEAS - RAP SYSTOLE: 3 MMHG
BH CV ECHO MEAS - RV MAX PG: 1.4 MMHG
BH CV ECHO MEAS - RV MEAN PG: 0.83 MMHG
BH CV ECHO MEAS - RV V1 MAX: 60.1 CM/SEC
BH CV ECHO MEAS - RV V1 MEAN: 43 CM/SEC
BH CV ECHO MEAS - RV V1 VTI: 10.3 CM
BH CV ECHO MEAS - RVSP: 23 MMHG
BH CV ECHO MEAS - SI(AO): 164.8 ML/M^2
BH CV ECHO MEAS - SI(CUBED): 44.2 ML/M^2
BH CV ECHO MEAS - SI(LVOT): 43.2 ML/M^2
BH CV ECHO MEAS - SI(MOD-SP2): 25.9 ML/M^2
BH CV ECHO MEAS - SI(MOD-SP4): 20.8 ML/M^2
BH CV ECHO MEAS - SI(TEICH): 40.2 ML/M^2
BH CV ECHO MEAS - SV(AO): 293.2 ML
BH CV ECHO MEAS - SV(CUBED): 78.7 ML
BH CV ECHO MEAS - SV(LVOT): 76.9 ML
BH CV ECHO MEAS - SV(MOD-SP2): 46 ML
BH CV ECHO MEAS - SV(MOD-SP4): 37 ML
BH CV ECHO MEAS - SV(TEICH): 71.5 ML
BH CV ECHO MEAS - TAPSE (>1.6): 2.6 CM2
BH CV ECHO MEAS - TR MAX VEL: 221.3 CM/SEC
BH CV ECHO MEASUREMENTS AVERAGE E/E' RATIO: 8.83
BH CV XLRA - RV BASE: 3.6 CM
BH CV XLRA - TDI S': 13 CM/SEC
LEFT ATRIUM VOLUME INDEX: 30 ML/M2
MAXIMAL PREDICTED HEART RATE: 133 BPM
SINUS: 3 CM
STJ: 2.5 CM
STRESS TARGET HR: 113 BPM

## 2018-04-26 PROCEDURE — 93306 TTE W/DOPPLER COMPLETE: CPT | Performed by: INTERNAL MEDICINE

## 2018-04-26 PROCEDURE — 93306 TTE W/DOPPLER COMPLETE: CPT

## 2018-05-10 ENCOUNTER — OFFICE VISIT (OUTPATIENT)
Dept: FAMILY MEDICINE CLINIC | Facility: CLINIC | Age: 83
End: 2018-05-10

## 2018-05-10 VITALS
BODY MASS INDEX: 26.98 KG/M2 | HEART RATE: 75 BPM | TEMPERATURE: 97.4 F | RESPIRATION RATE: 16 BRPM | SYSTOLIC BLOOD PRESSURE: 141 MMHG | DIASTOLIC BLOOD PRESSURE: 70 MMHG | HEIGHT: 64 IN | WEIGHT: 158 LBS

## 2018-05-10 DIAGNOSIS — K44.9 HIATAL HERNIA: ICD-10-CM

## 2018-05-10 DIAGNOSIS — I89.0 LYMPHEDEMA OF BOTH LOWER EXTREMITIES: Primary | ICD-10-CM

## 2018-05-10 DIAGNOSIS — I10 BENIGN ESSENTIAL HYPERTENSION: ICD-10-CM

## 2018-05-10 DIAGNOSIS — K21.9 GASTROESOPHAGEAL REFLUX DISEASE, ESOPHAGITIS PRESENCE NOT SPECIFIED: ICD-10-CM

## 2018-05-10 DIAGNOSIS — G47.09 OTHER INSOMNIA: ICD-10-CM

## 2018-05-10 DIAGNOSIS — R45.1 AGITATION: ICD-10-CM

## 2018-05-10 DIAGNOSIS — R41.3 MEMORY LOSS: ICD-10-CM

## 2018-05-10 PROCEDURE — 99214 OFFICE O/P EST MOD 30 MIN: CPT | Performed by: FAMILY MEDICINE

## 2018-05-10 RX ORDER — RIVASTIGMINE 4.6 MG/24H
1 PATCH, EXTENDED RELEASE TRANSDERMAL DAILY
Qty: 90 PATCH | Refills: 1 | Status: SHIPPED | OUTPATIENT
Start: 2018-05-10 | End: 2018-11-06

## 2018-05-10 RX ORDER — NIFEDIPINE 60 MG/1
60 TABLET, EXTENDED RELEASE ORAL DAILY
Qty: 90 TABLET | Refills: 1 | Status: SHIPPED | OUTPATIENT
Start: 2018-05-10 | End: 2018-10-29 | Stop reason: SDUPTHER

## 2018-05-10 RX ORDER — SPIRONOLACTONE 25 MG/1
25 TABLET ORAL DAILY
Qty: 90 TABLET | Refills: 1 | Status: SHIPPED | OUTPATIENT
Start: 2018-05-10 | End: 2018-10-29 | Stop reason: SDUPTHER

## 2018-05-10 RX ORDER — METOPROLOL SUCCINATE 50 MG/1
50 TABLET, EXTENDED RELEASE ORAL NIGHTLY
Qty: 90 TABLET | Refills: 1 | Status: SHIPPED | OUTPATIENT
Start: 2018-05-10

## 2018-05-10 RX ORDER — DOXAZOSIN MESYLATE 4 MG/1
4 TABLET ORAL DAILY
Qty: 90 TABLET | Refills: 1 | Status: SHIPPED | OUTPATIENT
Start: 2018-05-10

## 2018-05-10 RX ORDER — TRAZODONE HYDROCHLORIDE 50 MG/1
TABLET ORAL
COMMUNITY
Start: 2018-04-25 | End: 2018-05-10

## 2018-05-10 RX ORDER — OMEPRAZOLE 40 MG/1
40 CAPSULE, DELAYED RELEASE ORAL NIGHTLY
Qty: 90 CAPSULE | Refills: 1 | Status: SHIPPED | OUTPATIENT
Start: 2018-05-10

## 2018-05-10 NOTE — PATIENT INSTRUCTIONS
"DASH Eating Plan  DASH stands for \"Dietary Approaches to Stop Hypertension.\" The DASH eating plan is a healthy eating plan that has been shown to reduce high blood pressure (hypertension). It may also reduce your risk for type 2 diabetes, heart disease, and stroke. The DASH eating plan may also help with weight loss.  What are tips for following this plan?  General guidelines   · Avoid eating more than 2,300 mg (milligrams) of salt (sodium) a day. If you have hypertension, you may need to reduce your sodium intake to 1,500 mg a day.  · Limit alcohol intake to no more than 1 drink a day for nonpregnant women and 2 drinks a day for men. One drink equals 12 oz of beer, 5 oz of wine, or 1½ oz of hard liquor.  · Work with your health care provider to maintain a healthy body weight or to lose weight. Ask what an ideal weight is for you.  · Get at least 30 minutes of exercise that causes your heart to beat faster (aerobic exercise) most days of the week. Activities may include walking, swimming, or biking.  · Work with your health care provider or diet and nutrition specialist (dietitian) to adjust your eating plan to your individual calorie needs.  Reading food labels   · Check food labels for the amount of sodium per serving. Choose foods with less than 5 percent of the Daily Value of sodium. Generally, foods with less than 300 mg of sodium per serving fit into this eating plan.  · To find whole grains, look for the word \"whole\" as the first word in the ingredient list.  Shopping   · Buy products labeled as \"low-sodium\" or \"no salt added.\"  · Buy fresh foods. Avoid canned foods and premade or frozen meals.  Cooking   · Avoid adding salt when cooking. Use salt-free seasonings or herbs instead of table salt or sea salt. Check with your health care provider or pharmacist before using salt substitutes.  · Do not lemus foods. Cook foods using healthy methods such as baking, boiling, grilling, and broiling instead.  · Cook with " heart-healthy oils, such as olive, canola, soybean, or sunflower oil.  Meal planning     · Eat a balanced diet that includes:  ¨ 5 or more servings of fruits and vegetables each day. At each meal, try to fill half of your plate with fruits and vegetables.  ¨ Up to 6-8 servings of whole grains each day.  ¨ Less than 6 oz of lean meat, poultry, or fish each day. A 3-oz serving of meat is about the same size as a deck of cards. One egg equals 1 oz.  ¨ 2 servings of low-fat dairy each day.  ¨ A serving of nuts, seeds, or beans 5 times each week.  ¨ Heart-healthy fats. Healthy fats called Omega-3 fatty acids are found in foods such as flaxseeds and coldwater fish, like sardines, salmon, and mackerel.  · Limit how much you eat of the following:  ¨ Canned or prepackaged foods.  ¨ Food that is high in trans fat, such as fried foods.  ¨ Food that is high in saturated fat, such as fatty meat.  ¨ Sweets, desserts, sugary drinks, and other foods with added sugar.  ¨ Full-fat dairy products.  · Do not salt foods before eating.  · Try to eat at least 2 vegetarian meals each week.  · Eat more home-cooked food and less restaurant, buffet, and fast food.  · When eating at a restaurant, ask that your food be prepared with less salt or no salt, if possible.  What foods are recommended?  The items listed may not be a complete list. Talk with your dietitian about what dietary choices are best for you.  Grains   Whole-grain or whole-wheat bread. Whole-grain or whole-wheat pasta. Brown rice. Oatmeal. Quinoa. Bulgur. Whole-grain and low-sodium cereals. Kandy bread. Low-fat, low-sodium crackers. Whole-wheat flour tortillas.  Vegetables   Fresh or frozen vegetables (raw, steamed, roasted, or grilled). Low-sodium or reduced-sodium tomato and vegetable juice. Low-sodium or reduced-sodium tomato sauce and tomato paste. Low-sodium or reduced-sodium canned vegetables.  Fruits   All fresh, dried, or frozen fruit. Canned fruit in natural juice  (without added sugar).  Meat and other protein foods   Skinless chicken or turkey. Ground chicken or turkey. Pork with fat trimmed off. Fish and seafood. Egg whites. Dried beans, peas, or lentils. Unsalted nuts, nut butters, and seeds. Unsalted canned beans. Lean cuts of beef with fat trimmed off. Low-sodium, lean deli meat.  Dairy   Low-fat (1%) or fat-free (skim) milk. Fat-free, low-fat, or reduced-fat cheeses. Nonfat, low-sodium ricotta or cottage cheese. Low-fat or nonfat yogurt. Low-fat, low-sodium cheese.  Fats and oils   Soft margarine without trans fats. Vegetable oil. Low-fat, reduced-fat, or light mayonnaise and salad dressings (reduced-sodium). Canola, safflower, olive, soybean, and sunflower oils. Avocado.  Seasoning and other foods   Herbs. Spices. Seasoning mixes without salt. Unsalted popcorn and pretzels. Fat-free sweets.  What foods are not recommended?  The items listed may not be a complete list. Talk with your dietitian about what dietary choices are best for you.  Grains   Baked goods made with fat, such as croissants, muffins, or some breads. Dry pasta or rice meal packs.  Vegetables   Creamed or fried vegetables. Vegetables in a cheese sauce. Regular canned vegetables (not low-sodium or reduced-sodium). Regular canned tomato sauce and paste (not low-sodium or reduced-sodium). Regular tomato and vegetable juice (not low-sodium or reduced-sodium). Pickles. Olives.  Fruits   Canned fruit in a light or heavy syrup. Fried fruit. Fruit in cream or butter sauce.  Meat and other protein foods   Fatty cuts of meat. Ribs. Fried meat. Altman. Sausage. Bologna and other processed lunch meats. Salami. Fatback. Hotdogs. Bratwurst. Salted nuts and seeds. Canned beans with added salt. Canned or smoked fish. Whole eggs or egg yolks. Chicken or turkey with skin.  Dairy   Whole or 2% milk, cream, and half-and-half. Whole or full-fat cream cheese. Whole-fat or sweetened yogurt. Full-fat cheese. Nondairy creamers.  Whipped toppings. Processed cheese and cheese spreads.  Fats and oils   Butter. Stick margarine. Lard. Shortening. Ghee. Altman fat. Tropical oils, such as coconut, palm kernel, or palm oil.  Seasoning and other foods   Salted popcorn and pretzels. Onion salt, garlic salt, seasoned salt, table salt, and sea salt. Worcestershire sauce. Tartar sauce. Barbecue sauce. Teriyaki sauce. Soy sauce, including reduced-sodium. Steak sauce. Canned and packaged gravies. Fish sauce. Oyster sauce. Cocktail sauce. Horseradish that you find on the shelf. Ketchup. Mustard. Meat flavorings and tenderizers. Bouillon cubes. Hot sauce and Tabasco sauce. Premade or packaged marinades. Premade or packaged taco seasonings. Relishes. Regular salad dressings.  Where to find more information:  · National [employer] [place]: www.nhlbi.nih.gov  · American Heart Association: www.heart.org  Summary  · The DASH eating plan is a healthy eating plan that has been shown to reduce high blood pressure (hypertension). It may also reduce your risk for type 2 diabetes, heart disease, and stroke.  · With the DASH eating plan, you should limit salt (sodium) intake to 2,300 mg a day. If you have hypertension, you may need to reduce your sodium intake to 1,500 mg a day.  · When on the DASH eating plan, aim to eat more fresh fruits and vegetables, whole grains, lean proteins, low-fat dairy, and heart-healthy fats.  · Work with your health care provider or diet and nutrition specialist (dietitian) to adjust your eating plan to your individual calorie needs.  This information is not intended to replace advice given to you by your health care provider. Make sure you discuss any questions you have with your health care provider.  Document Released: 12/06/2012 Document Revised: 12/11/2017 Document Reviewed: 12/11/2017  [employer] Patient Education © 2017 [employer]

## 2018-05-10 NOTE — PROGRESS NOTES
"Chief Complaint   Patient presents with   • Edema   • Other     Test results       Subjective   Pt is here to follow-up on his routine medications.  He is due to see the lymphedema clinic in the near future to deal with his swelling of his legs right greater than left.  He did have echocardiogram which did not show congestive heart failure.  Blood pressure is controlled.  GERD symptoms are stable.  Memory loss is progressing.  B12 deficiency is stable.  Agitation issues have improved somewhat.  I have reviewed and updated his medications, medical history and problem list during today's office visit.     Patient Care Team:  Oli Hardwick MD as PCP - General (Family Medicine)  Oli Hardwick MD as PCP - Family Medicine  José Miguel Bo MD as Consulting Physician (Urology)    Social History   Substance Use Topics   • Smoking status: Never Smoker   • Smokeless tobacco: Never Used   • Alcohol use No       Review of Systems   Cardiovascular: Positive for leg swelling.   Neurological: Positive for memory problem.   Psychiatric/Behavioral: Positive for agitation (improving).       Objective     /70   Pulse 75   Temp 97.4 °F (36.3 °C) (Oral)   Resp 16   Ht 162.6 cm (64\")   Wt 71.7 kg (158 lb)   BMI 27.12 kg/m²     Body mass index is 27.12 kg/m².    Physical Exam   Constitutional: He is oriented to person, place, and time. He appears well-developed. No distress.   Eyes: Conjunctivae and lids are normal.   Neck: Carotid bruit is not present.   Cardiovascular: Normal rate, regular rhythm and normal heart sounds.    Pulmonary/Chest: Effort normal and breath sounds normal.   Musculoskeletal: He exhibits edema.   Neurological: He is alert and oriented to person, place, and time.   Skin: Skin is warm and dry.   Psychiatric: He has a normal mood and affect. His behavior is normal.   Vitals reviewed.       Data Reviewed:  Reading physician: Dillan Paez MD Ordering physician: Oli Hardwick MD Study date: 4/26/18 "   Patient Information     Patient Name  Stephen Singh MRN  [id number] Sex  Male  (Age)  1930 (87 y.o.)   Sedation Narrator Report     Sedation Narrator Report   Interpretation Summary     · Left ventricular systolic function is normal. Calculated EF = 65%. Estimated EF was in agreement with the calculated EF. Normal left ventricular cavity size noted. All left ventricular wall segments contract normally. Left ventricular wall thickness is consistent with mild concentric hypertrophy. Left ventricular diastolic dysfunction is noted (grade I) consistent with impaired relaxation.  · There is moderate thickening of the aortic valve. No hemodynamically significant aortic valve stenosis is present.            Lab Results   Component Value Date    GLU 93 2018    BUN 16 2018    CREATININE 0.84 2018    EGFRIFNONA 86 2018    EGFRIFAFRI 105 2018     2018    K 4.5 2018     2018    CALCIUM 8.9 2018    PROTENTOTREF 6.1 2018    ALBUMIN 3.90 2018    LABGLOBREF 2.2 2018    BILITOT 0.8 2018    ALKPHOS 51 2018    AST 20 2018    ALT 12 2018     CBC w/ diff:   Lab Results   Component Value Date    WBC 5.66 2018    RBC 4.24 (L) 2018    HGB 12.8 (L) 2018    HCT 39.8 (L) 2018    MCV 93.9 2018    MCH 30.2 2018    MCHC 32.2 (L) 2018    RDW 13.2 2018     2018    NEUTRORELPCT 69.7 2018    LYMPHORELPCT 22.1 2018    MONORELPCT 6.4 2018    EOSRELPCT 1.6 2018    BASORELPCT 0.2 2018     LIPID PANEL:  Lab Results   Component Value Date    CHLPL 145 08/15/2017    TRIG 86 08/15/2017    HDL 46 08/15/2017    VLDL 17.2 08/15/2017    LDL 82 08/15/2017    LDLHDL 1.78 08/15/2017     TSH:  Lab Results   Component Value Date    TSH 2.040 2018       Assessment/Plan     Problem List Items Addressed This Visit        Cardiovascular and  Mediastinum    Benign essential hypertension    Relevant Medications    NIFEdipine XL (PROCARDIA XL) 60 MG 24 hr tablet    spironolactone (ALDACTONE) 25 MG tablet    metoprolol succinate XL (TOPROL-XL) 50 MG 24 hr tablet    doxazosin (CARDURA) 4 MG tablet    Other Relevant Orders    Comprehensive metabolic panel    CBC and Differential    TSH       Respiratory    Hiatal hernia    Relevant Medications    omeprazole (PRILOSEC) 40 MG capsule       Digestive    GERD (gastroesophageal reflux disease)    Relevant Medications    omeprazole (PRILOSEC) 40 MG capsule       Other    Memory loss    Relevant Medications    rivastigmine (EXELON) 4.6 MG/24HR patch    Agitation    Lymphedema of both lower extremities R>L - Primary  Dash diet shared with patient    Other insomnia      Other Visit Diagnoses    None.         Orders Placed This Encounter   Procedures   • Comprehensive metabolic panel     Standing Status:   Future     Standing Expiration Date:   2/4/2019   • TSH     Standing Status:   Future     Standing Expiration Date:   2/4/2019   • CBC and Differential     Standing Status:   Future     Standing Expiration Date:   2/4/2019     Order Specific Question:   Manual Differential     Answer:   No         Current Outpatient Prescriptions:   •  doxazosin (CARDURA) 4 MG tablet, Take 1 tablet by mouth Daily., Disp: 90 tablet, Rfl: 1  •  metoprolol succinate XL (TOPROL-XL) 50 MG 24 hr tablet, Take 1 tablet by mouth Every Night., Disp: 90 tablet, Rfl: 1  •  NIFEdipine XL (PROCARDIA XL) 60 MG 24 hr tablet, Take 1 tablet by mouth Daily for 180 days., Disp: 90 tablet, Rfl: 1  •  omeprazole (PRILOSEC) 40 MG capsule, Take 1 capsule by mouth Every Night., Disp: 90 capsule, Rfl: 1  •  rivastigmine (EXELON) 4.6 MG/24HR patch, Place 1 patch on the skin Daily for 180 days., Disp: 90 patch, Rfl: 1  •  spironolactone (ALDACTONE) 25 MG tablet, Take 1 tablet by mouth Daily for 180 days., Disp: 90 tablet, Rfl: 1  •  cyanocobalamin (RA VITAMIN  B-12) 100 MCG tablet, Take 1 tablet by mouth Daily for 180 days., Disp: 30 tablet, Rfl: 5    Return in about 6 months (around 11/10/2018) for Recheck.

## 2018-10-07 ENCOUNTER — RESULTS ENCOUNTER (OUTPATIENT)
Dept: FAMILY MEDICINE CLINIC | Facility: CLINIC | Age: 83
End: 2018-10-07

## 2018-10-07 DIAGNOSIS — I10 BENIGN ESSENTIAL HYPERTENSION: ICD-10-CM

## 2018-10-29 DIAGNOSIS — I10 BENIGN ESSENTIAL HYPERTENSION: ICD-10-CM

## 2018-10-29 RX ORDER — SPIRONOLACTONE 25 MG/1
TABLET ORAL
Qty: 90 TABLET | Refills: 0 | Status: SHIPPED | OUTPATIENT
Start: 2018-10-29

## 2018-10-29 RX ORDER — NIFEDIPINE 60 MG/1
TABLET, EXTENDED RELEASE ORAL
Qty: 90 TABLET | Refills: 0 | Status: SHIPPED | OUTPATIENT
Start: 2018-10-29